# Patient Record
Sex: MALE | Race: WHITE | NOT HISPANIC OR LATINO | Employment: UNEMPLOYED | URBAN - METROPOLITAN AREA
[De-identification: names, ages, dates, MRNs, and addresses within clinical notes are randomized per-mention and may not be internally consistent; named-entity substitution may affect disease eponyms.]

---

## 2017-03-07 ENCOUNTER — ALLSCRIPTS OFFICE VISIT (OUTPATIENT)
Dept: OTHER | Facility: OTHER | Age: 37
End: 2017-03-07

## 2017-03-07 DIAGNOSIS — I87.2 VENOUS INSUFFICIENCY (CHRONIC) (PERIPHERAL): ICD-10-CM

## 2017-04-10 ENCOUNTER — APPOINTMENT (OUTPATIENT)
Dept: LAB | Facility: MEDICAL CENTER | Age: 37
End: 2017-04-10

## 2017-04-10 ENCOUNTER — OFFICE VISIT (OUTPATIENT)
Dept: URGENT CARE | Facility: MEDICAL CENTER | Age: 37
End: 2017-04-10

## 2017-04-10 ENCOUNTER — TRANSCRIBE ORDERS (OUTPATIENT)
Dept: URGENT CARE | Facility: MEDICAL CENTER | Age: 37
End: 2017-04-10

## 2017-04-10 DIAGNOSIS — Z00.00 PHYSICAL EXAM: ICD-10-CM

## 2017-04-10 DIAGNOSIS — Z00.00 PHYSICAL EXAM: Primary | ICD-10-CM

## 2017-04-10 LAB
ALBUMIN SERPL BCP-MCNC: 4.2 G/DL (ref 3.5–5)
ALP SERPL-CCNC: 67 U/L (ref 46–116)
ALT SERPL W P-5'-P-CCNC: 111 U/L (ref 12–78)
ANION GAP SERPL CALCULATED.3IONS-SCNC: 9 MMOL/L (ref 4–13)
AST SERPL W P-5'-P-CCNC: 61 U/L (ref 5–45)
ATRIAL RATE: 54 BPM
ATRIAL RATE: 54 BPM
BASOPHILS # BLD AUTO: 0.02 THOUSANDS/ΜL (ref 0–0.1)
BASOPHILS NFR BLD AUTO: 0 % (ref 0–1)
BILIRUB SERPL-MCNC: 1.56 MG/DL (ref 0.2–1)
BUN SERPL-MCNC: 14 MG/DL (ref 5–25)
CALCIUM SERPL-MCNC: 9.4 MG/DL (ref 8.3–10.1)
CHLORIDE SERPL-SCNC: 104 MMOL/L (ref 100–108)
CHOLEST SERPL-MCNC: 250 MG/DL (ref 50–200)
CO2 SERPL-SCNC: 25 MMOL/L (ref 21–32)
CREAT SERPL-MCNC: 1.05 MG/DL (ref 0.6–1.3)
EOSINOPHIL # BLD AUTO: 0.15 THOUSAND/ΜL (ref 0–0.61)
EOSINOPHIL NFR BLD AUTO: 3 % (ref 0–6)
ERYTHROCYTE [DISTWIDTH] IN BLOOD BY AUTOMATED COUNT: 11.9 % (ref 11.6–15.1)
GFR SERPL CREATININE-BSD FRML MDRD: >60 ML/MIN/1.73SQ M
GLUCOSE SERPL-MCNC: 100 MG/DL (ref 65–140)
HCT VFR BLD AUTO: 44.1 % (ref 36.5–49.3)
HDLC SERPL-MCNC: 36 MG/DL (ref 40–60)
HGB BLD-MCNC: 15.4 G/DL (ref 12–17)
LDLC SERPL CALC-MCNC: 164 MG/DL (ref 0–100)
LYMPHOCYTES # BLD AUTO: 1.15 THOUSANDS/ΜL (ref 0.6–4.47)
LYMPHOCYTES NFR BLD AUTO: 25 % (ref 14–44)
MCH RBC QN AUTO: 32.2 PG (ref 26.8–34.3)
MCHC RBC AUTO-ENTMCNC: 34.9 G/DL (ref 31.4–37.4)
MCV RBC AUTO: 92 FL (ref 82–98)
MONOCYTES # BLD AUTO: 0.38 THOUSAND/ΜL (ref 0.17–1.22)
MONOCYTES NFR BLD AUTO: 8 % (ref 4–12)
NEUTROPHILS # BLD AUTO: 2.84 THOUSANDS/ΜL (ref 1.85–7.62)
NEUTS SEG NFR BLD AUTO: 64 % (ref 43–75)
NRBC BLD AUTO-RTO: 0 /100 WBCS
P AXIS: -21 DEGREES
P AXIS: -5 DEGREES
PLATELET # BLD AUTO: 199 THOUSANDS/UL (ref 149–390)
PMV BLD AUTO: 12.1 FL (ref 8.9–12.7)
POTASSIUM SERPL-SCNC: 3.6 MMOL/L (ref 3.5–5.3)
PR INTERVAL: 142 MS
PR INTERVAL: 150 MS
PROT SERPL-MCNC: 8.1 G/DL (ref 6.4–8.2)
QRS AXIS: -9 DEGREES
QRS AXIS: -9 DEGREES
QRSD INTERVAL: 86 MS
QRSD INTERVAL: 92 MS
QT INTERVAL: 442 MS
QT INTERVAL: 444 MS
QTC INTERVAL: 419 MS
QTC INTERVAL: 421 MS
RBC # BLD AUTO: 4.79 MILLION/UL (ref 3.88–5.62)
SODIUM SERPL-SCNC: 138 MMOL/L (ref 136–145)
T WAVE AXIS: 11 DEGREES
T WAVE AXIS: 11 DEGREES
TRIGL SERPL-MCNC: 248 MG/DL
VENTRICULAR RATE: 54 BPM
VENTRICULAR RATE: 54 BPM
WBC # BLD AUTO: 4.54 THOUSAND/UL (ref 4.31–10.16)

## 2017-04-10 PROCEDURE — 93005 ELECTROCARDIOGRAM TRACING: CPT

## 2017-04-10 PROCEDURE — 85025 COMPLETE CBC W/AUTO DIFF WBC: CPT

## 2017-04-10 PROCEDURE — 80053 COMPREHEN METABOLIC PANEL: CPT

## 2017-04-10 PROCEDURE — 36415 COLL VENOUS BLD VENIPUNCTURE: CPT

## 2017-04-10 PROCEDURE — 80061 LIPID PANEL: CPT

## 2017-04-25 ENCOUNTER — GENERIC CONVERSION - ENCOUNTER (OUTPATIENT)
Dept: OTHER | Facility: OTHER | Age: 37
End: 2017-04-25

## 2017-05-17 DIAGNOSIS — I83.813 VARICOSE VEINS OF BOTH LOWER EXTREMITIES WITH PAIN: ICD-10-CM

## 2017-05-26 ENCOUNTER — APPOINTMENT (OUTPATIENT)
Dept: LAB | Facility: MEDICAL CENTER | Age: 37
End: 2017-05-26
Payer: COMMERCIAL

## 2017-05-26 DIAGNOSIS — I87.2 VENOUS INSUFFICIENCY (CHRONIC) (PERIPHERAL): ICD-10-CM

## 2017-05-26 LAB
ANION GAP SERPL CALCULATED.3IONS-SCNC: 8 MMOL/L (ref 4–13)
BUN SERPL-MCNC: 11 MG/DL (ref 5–25)
CALCIUM SERPL-MCNC: 9.4 MG/DL (ref 8.3–10.1)
CHLORIDE SERPL-SCNC: 106 MMOL/L (ref 100–108)
CO2 SERPL-SCNC: 27 MMOL/L (ref 21–32)
CREAT SERPL-MCNC: 1.08 MG/DL (ref 0.6–1.3)
ERYTHROCYTE [DISTWIDTH] IN BLOOD BY AUTOMATED COUNT: 12.1 % (ref 11.6–15.1)
GFR SERPL CREATININE-BSD FRML MDRD: >60 ML/MIN/1.73SQ M
GLUCOSE P FAST SERPL-MCNC: 84 MG/DL (ref 65–99)
HCT VFR BLD AUTO: 45.2 % (ref 36.5–49.3)
HGB BLD-MCNC: 15.7 G/DL (ref 12–17)
MCH RBC QN AUTO: 32.5 PG (ref 26.8–34.3)
MCHC RBC AUTO-ENTMCNC: 34.7 G/DL (ref 31.4–37.4)
MCV RBC AUTO: 94 FL (ref 82–98)
PLATELET # BLD AUTO: 182 THOUSANDS/UL (ref 149–390)
PMV BLD AUTO: 11.8 FL (ref 8.9–12.7)
POTASSIUM SERPL-SCNC: 3.9 MMOL/L (ref 3.5–5.3)
RBC # BLD AUTO: 4.83 MILLION/UL (ref 3.88–5.62)
SODIUM SERPL-SCNC: 141 MMOL/L (ref 136–145)
WBC # BLD AUTO: 3.67 THOUSAND/UL (ref 4.31–10.16)

## 2017-05-26 PROCEDURE — 36415 COLL VENOUS BLD VENIPUNCTURE: CPT

## 2017-05-26 PROCEDURE — 80048 BASIC METABOLIC PNL TOTAL CA: CPT

## 2017-05-26 PROCEDURE — 85027 COMPLETE CBC AUTOMATED: CPT

## 2017-06-06 RX ORDER — MULTIVITAMIN
1 TABLET ORAL DAILY
COMMUNITY
End: 2018-08-07 | Stop reason: SDUPTHER

## 2017-06-06 RX ORDER — LANOLIN ALCOHOL/MO/W.PET/CERES
1 CREAM (GRAM) TOPICAL 3 TIMES DAILY
COMMUNITY

## 2017-06-06 RX ORDER — OMEPRAZOLE 20 MG/1
20 CAPSULE, DELAYED RELEASE ORAL DAILY
COMMUNITY
End: 2018-07-25 | Stop reason: SDUPTHER

## 2017-06-13 ENCOUNTER — APPOINTMENT (OUTPATIENT)
Dept: RADIOLOGY | Facility: HOSPITAL | Age: 37
End: 2017-06-13
Payer: COMMERCIAL

## 2017-06-13 ENCOUNTER — ANESTHESIA EVENT (OUTPATIENT)
Dept: PERIOP | Facility: HOSPITAL | Age: 37
End: 2017-06-13
Payer: COMMERCIAL

## 2017-06-13 ENCOUNTER — ANESTHESIA (OUTPATIENT)
Dept: PERIOP | Facility: HOSPITAL | Age: 37
End: 2017-06-13
Payer: COMMERCIAL

## 2017-06-13 ENCOUNTER — HOSPITAL ENCOUNTER (OUTPATIENT)
Facility: HOSPITAL | Age: 37
Setting detail: OUTPATIENT SURGERY
Discharge: HOME | End: 2017-06-13
Attending: SURGERY | Admitting: SURGERY
Payer: COMMERCIAL

## 2017-06-13 VITALS
BODY MASS INDEX: 46.28 KG/M2 | HEIGHT: 66 IN | HEART RATE: 75 BPM | WEIGHT: 288 LBS | DIASTOLIC BLOOD PRESSURE: 78 MMHG | OXYGEN SATURATION: 95 % | RESPIRATION RATE: 16 BRPM | SYSTOLIC BLOOD PRESSURE: 150 MMHG | TEMPERATURE: 99.1 F

## 2017-06-13 DIAGNOSIS — I83.811 VARICOSE VEINS OF RIGHT LOWER EXTREMITY WITH PAIN: ICD-10-CM

## 2017-06-13 PROCEDURE — 93971 EXTREMITY STUDY: CPT

## 2017-06-13 RX ORDER — FENTANYL CITRATE 50 UG/ML
INJECTION, SOLUTION INTRAMUSCULAR; INTRAVENOUS AS NEEDED
Status: DISCONTINUED | OUTPATIENT
Start: 2017-06-13 | End: 2017-06-13 | Stop reason: SURG

## 2017-06-13 RX ORDER — PROPOFOL 10 MG/ML
INJECTION, EMULSION INTRAVENOUS AS NEEDED
Status: DISCONTINUED | OUTPATIENT
Start: 2017-06-13 | End: 2017-06-13 | Stop reason: SURG

## 2017-06-13 RX ORDER — ONDANSETRON 2 MG/ML
INJECTION INTRAMUSCULAR; INTRAVENOUS AS NEEDED
Status: DISCONTINUED | OUTPATIENT
Start: 2017-06-13 | End: 2017-06-13 | Stop reason: SURG

## 2017-06-13 RX ORDER — CHLORHEXIDINE GLUCONATE 0.12 MG/ML
15 RINSE ORAL ONCE
Status: DISCONTINUED | OUTPATIENT
Start: 2017-06-13 | End: 2017-06-13

## 2017-06-13 RX ORDER — SODIUM CHLORIDE, SODIUM LACTATE, POTASSIUM CHLORIDE, CALCIUM CHLORIDE 600; 310; 30; 20 MG/100ML; MG/100ML; MG/100ML; MG/100ML
20 INJECTION, SOLUTION INTRAVENOUS CONTINUOUS
Status: DISCONTINUED | OUTPATIENT
Start: 2017-06-13 | End: 2017-06-13 | Stop reason: HOSPADM

## 2017-06-13 RX ORDER — SUCCINYLCHOLINE/SOD CL,ISO/PF 100 MG/5ML
SYRINGE (ML) INTRAVENOUS AS NEEDED
Status: DISCONTINUED | OUTPATIENT
Start: 2017-06-13 | End: 2017-06-13 | Stop reason: SURG

## 2017-06-13 RX ORDER — SODIUM CHLORIDE, SODIUM LACTATE, POTASSIUM CHLORIDE, CALCIUM CHLORIDE 600; 310; 30; 20 MG/100ML; MG/100ML; MG/100ML; MG/100ML
100 INJECTION, SOLUTION INTRAVENOUS CONTINUOUS
Status: DISCONTINUED | OUTPATIENT
Start: 2017-06-13 | End: 2017-06-13 | Stop reason: HOSPADM

## 2017-06-13 RX ORDER — GLYCOPYRROLATE 0.2 MG/ML
INJECTION INTRAMUSCULAR; INTRAVENOUS AS NEEDED
Status: DISCONTINUED | OUTPATIENT
Start: 2017-06-13 | End: 2017-06-13 | Stop reason: SURG

## 2017-06-13 RX ORDER — FENTANYL CITRATE/PF 50 MCG/ML
25 SYRINGE (ML) INJECTION
Status: DISCONTINUED | OUTPATIENT
Start: 2017-06-13 | End: 2017-06-13 | Stop reason: HOSPADM

## 2017-06-13 RX ORDER — KETOROLAC TROMETHAMINE 30 MG/ML
INJECTION, SOLUTION INTRAMUSCULAR; INTRAVENOUS AS NEEDED
Status: DISCONTINUED | OUTPATIENT
Start: 2017-06-13 | End: 2017-06-13 | Stop reason: SURG

## 2017-06-13 RX ORDER — ONDANSETRON 2 MG/ML
4 INJECTION INTRAMUSCULAR; INTRAVENOUS ONCE AS NEEDED
Status: DISCONTINUED | OUTPATIENT
Start: 2017-06-13 | End: 2017-06-13 | Stop reason: HOSPADM

## 2017-06-13 RX ORDER — MIDAZOLAM HYDROCHLORIDE 1 MG/ML
INJECTION INTRAMUSCULAR; INTRAVENOUS AS NEEDED
Status: DISCONTINUED | OUTPATIENT
Start: 2017-06-13 | End: 2017-06-13 | Stop reason: SURG

## 2017-06-13 RX ADMIN — DEXAMETHASONE SODIUM PHOSPHATE 5 MG: 10 INJECTION INTRAMUSCULAR; INTRAVENOUS at 10:39

## 2017-06-13 RX ADMIN — FENTANYL CITRATE 50 MCG: 50 INJECTION, SOLUTION INTRAMUSCULAR; INTRAVENOUS at 11:49

## 2017-06-13 RX ADMIN — FENTANYL CITRATE 50 MCG: 50 INJECTION, SOLUTION INTRAMUSCULAR; INTRAVENOUS at 10:41

## 2017-06-13 RX ADMIN — GLYCOPYRROLATE 0.2 MG: 0.2 INJECTION INTRAMUSCULAR; INTRAVENOUS at 11:13

## 2017-06-13 RX ADMIN — SODIUM CHLORIDE, SODIUM LACTATE, POTASSIUM CHLORIDE, AND CALCIUM CHLORIDE: .6; .31; .03; .02 INJECTION, SOLUTION INTRAVENOUS at 11:30

## 2017-06-13 RX ADMIN — ONDANSETRON 4 MG: 2 INJECTION INTRAMUSCULAR; INTRAVENOUS at 10:39

## 2017-06-13 RX ADMIN — KETOROLAC TROMETHAMINE 30 MG: 30 INJECTION, SOLUTION INTRAMUSCULAR at 12:02

## 2017-06-13 RX ADMIN — SODIUM CHLORIDE, SODIUM LACTATE, POTASSIUM CHLORIDE, AND CALCIUM CHLORIDE 20 ML/HR: .6; .31; .03; .02 INJECTION, SOLUTION INTRAVENOUS at 09:38

## 2017-06-13 RX ADMIN — MIDAZOLAM HYDROCHLORIDE 2 MG: 1 INJECTION, SOLUTION INTRAMUSCULAR; INTRAVENOUS at 10:10

## 2017-06-13 RX ADMIN — FENTANYL CITRATE 100 MCG: 50 INJECTION, SOLUTION INTRAMUSCULAR; INTRAVENOUS at 10:18

## 2017-06-13 RX ADMIN — FENTANYL CITRATE 50 MCG: 50 INJECTION, SOLUTION INTRAMUSCULAR; INTRAVENOUS at 11:42

## 2017-06-13 RX ADMIN — CEFAZOLIN SODIUM 3000 MG: 1 SOLUTION INTRAVENOUS at 10:30

## 2017-06-13 RX ADMIN — PROPOFOL 300 MG: 10 INJECTION, EMULSION INTRAVENOUS at 10:18

## 2017-06-13 RX ADMIN — Medication 180 MG: at 10:18

## 2017-06-15 ENCOUNTER — ALLSCRIPTS OFFICE VISIT (OUTPATIENT)
Dept: OTHER | Facility: OTHER | Age: 37
End: 2017-06-15

## 2017-06-17 ENCOUNTER — HOSPITAL ENCOUNTER (EMERGENCY)
Facility: HOSPITAL | Age: 37
Discharge: HOME | End: 2017-06-17
Attending: EMERGENCY MEDICINE | Admitting: EMERGENCY MEDICINE
Payer: COMMERCIAL

## 2017-06-17 ENCOUNTER — APPOINTMENT (EMERGENCY)
Dept: ULTRASOUND IMAGING | Facility: HOSPITAL | Age: 37
End: 2017-06-17
Payer: COMMERCIAL

## 2017-06-17 VITALS
DIASTOLIC BLOOD PRESSURE: 102 MMHG | TEMPERATURE: 98.1 F | SYSTOLIC BLOOD PRESSURE: 157 MMHG | HEART RATE: 70 BPM | OXYGEN SATURATION: 98 % | BODY MASS INDEX: 47.07 KG/M2 | WEIGHT: 291.6 LBS | RESPIRATION RATE: 18 BRPM

## 2017-06-17 DIAGNOSIS — I82.441 ACUTE DEEP VEIN THROMBOSIS (DVT) OF TIBIAL VEIN OF RIGHT LOWER EXTREMITY (HCC): Primary | ICD-10-CM

## 2017-06-17 LAB
ANION GAP SERPL CALCULATED.3IONS-SCNC: 9 MMOL/L (ref 4–13)
BUN SERPL-MCNC: 9 MG/DL (ref 5–25)
CALCIUM SERPL-MCNC: 9.5 MG/DL (ref 8.3–10.1)
CHLORIDE SERPL-SCNC: 106 MMOL/L (ref 100–108)
CO2 SERPL-SCNC: 28 MMOL/L (ref 21–32)
CREAT SERPL-MCNC: 1.07 MG/DL (ref 0.6–1.3)
GFR SERPL CREATININE-BSD FRML MDRD: >60 ML/MIN/1.73SQ M
GLUCOSE SERPL-MCNC: 91 MG/DL (ref 65–140)
POTASSIUM SERPL-SCNC: 4.3 MMOL/L (ref 3.5–5.3)
SODIUM SERPL-SCNC: 143 MMOL/L (ref 136–145)

## 2017-06-17 PROCEDURE — 80048 BASIC METABOLIC PNL TOTAL CA: CPT | Performed by: EMERGENCY MEDICINE

## 2017-06-17 PROCEDURE — 36415 COLL VENOUS BLD VENIPUNCTURE: CPT | Performed by: EMERGENCY MEDICINE

## 2017-06-17 PROCEDURE — 99284 EMERGENCY DEPT VISIT MOD MDM: CPT

## 2017-06-17 PROCEDURE — 93970 EXTREMITY STUDY: CPT

## 2017-06-19 ENCOUNTER — GENERIC CONVERSION - ENCOUNTER (OUTPATIENT)
Dept: OTHER | Facility: OTHER | Age: 37
End: 2017-06-19

## 2017-06-19 DIAGNOSIS — M79.605 PAIN OF LEFT LEG: ICD-10-CM

## 2017-06-19 DIAGNOSIS — I83.813 VARICOSE VEINS OF BOTH LOWER EXTREMITIES WITH PAIN: ICD-10-CM

## 2017-06-19 DIAGNOSIS — I82.441 EMBOLISM AND THROMBOSIS OF RIGHT TIBIAL VEIN (HCC): ICD-10-CM

## 2017-07-07 ENCOUNTER — HOSPITAL ENCOUNTER (OUTPATIENT)
Dept: NON INVASIVE DIAGNOSTICS | Facility: CLINIC | Age: 37
Discharge: HOME | End: 2017-07-07
Payer: COMMERCIAL

## 2017-07-07 ENCOUNTER — ALLSCRIPTS OFFICE VISIT (OUTPATIENT)
Dept: OTHER | Facility: OTHER | Age: 37
End: 2017-07-07

## 2017-07-07 DIAGNOSIS — I83.813 VARICOSE VEINS OF BOTH LOWER EXTREMITIES WITH PAIN: ICD-10-CM

## 2017-07-07 PROCEDURE — 93971 EXTREMITY STUDY: CPT

## 2017-07-10 ENCOUNTER — HOSPITAL ENCOUNTER (OUTPATIENT)
Dept: NON INVASIVE DIAGNOSTICS | Facility: CLINIC | Age: 37
Discharge: HOME | End: 2017-07-10
Payer: COMMERCIAL

## 2017-07-10 DIAGNOSIS — I82.441 EMBOLISM AND THROMBOSIS OF RIGHT TIBIAL VEIN (HCC): ICD-10-CM

## 2017-07-10 PROCEDURE — 93971 EXTREMITY STUDY: CPT

## 2017-07-18 ENCOUNTER — TRANSCRIBE ORDERS (OUTPATIENT)
Dept: LAB | Facility: CLINIC | Age: 37
End: 2017-07-18

## 2017-07-18 ENCOUNTER — APPOINTMENT (OUTPATIENT)
Dept: LAB | Facility: CLINIC | Age: 37
End: 2017-07-18
Payer: COMMERCIAL

## 2017-07-18 DIAGNOSIS — Z00.8 HEALTH EXAMINATION IN POPULATION SURVEY: ICD-10-CM

## 2017-07-18 DIAGNOSIS — Z00.8 HEALTH EXAMINATION IN POPULATION SURVEY: Primary | ICD-10-CM

## 2017-07-18 DIAGNOSIS — R79.89 ELEVATED LFTS: ICD-10-CM

## 2017-07-18 DIAGNOSIS — R79.89 ELEVATED LFTS: Primary | ICD-10-CM

## 2017-07-18 LAB
ALBUMIN SERPL BCP-MCNC: 4 G/DL (ref 3.5–5)
ALP SERPL-CCNC: 68 U/L (ref 46–116)
ALT SERPL W P-5'-P-CCNC: 98 U/L (ref 12–78)
ANION GAP SERPL CALCULATED.3IONS-SCNC: 10 MMOL/L (ref 4–13)
AST SERPL W P-5'-P-CCNC: 55 U/L (ref 5–45)
BILIRUB SERPL-MCNC: 1.4 MG/DL (ref 0.2–1)
BUN SERPL-MCNC: 11 MG/DL (ref 5–25)
CALCIUM SERPL-MCNC: 9.4 MG/DL (ref 8.3–10.1)
CHLORIDE SERPL-SCNC: 103 MMOL/L (ref 100–108)
CHOLEST SERPL-MCNC: 237 MG/DL (ref 50–200)
CO2 SERPL-SCNC: 27 MMOL/L (ref 21–32)
CREAT SERPL-MCNC: 1.23 MG/DL (ref 0.6–1.3)
EST. AVERAGE GLUCOSE BLD GHB EST-MCNC: 103 MG/DL
GFR SERPL CREATININE-BSD FRML MDRD: >60 ML/MIN/1.73SQ M
GGT SERPL-CCNC: 42 U/L (ref 5–85)
GLUCOSE P FAST SERPL-MCNC: 103 MG/DL (ref 65–99)
HBA1C MFR BLD: 5.2 % (ref 4.2–6.3)
HDLC SERPL-MCNC: 39 MG/DL (ref 40–60)
LDLC SERPL CALC-MCNC: 171 MG/DL (ref 0–100)
POTASSIUM SERPL-SCNC: 4.3 MMOL/L (ref 3.5–5.3)
PROT SERPL-MCNC: 7.6 G/DL (ref 6.4–8.2)
SODIUM SERPL-SCNC: 140 MMOL/L (ref 136–145)
TRIGL SERPL-MCNC: 134 MG/DL

## 2017-07-18 PROCEDURE — 80053 COMPREHEN METABOLIC PANEL: CPT

## 2017-07-18 PROCEDURE — 82977 ASSAY OF GGT: CPT

## 2017-07-18 PROCEDURE — 80061 LIPID PANEL: CPT

## 2017-07-18 PROCEDURE — 83036 HEMOGLOBIN GLYCOSYLATED A1C: CPT

## 2017-07-18 PROCEDURE — 36415 COLL VENOUS BLD VENIPUNCTURE: CPT

## 2017-08-11 ENCOUNTER — ALLSCRIPTS OFFICE VISIT (OUTPATIENT)
Dept: OTHER | Facility: OTHER | Age: 37
End: 2017-08-11

## 2017-10-11 ENCOUNTER — HOSPITAL ENCOUNTER (OUTPATIENT)
Dept: NON INVASIVE DIAGNOSTICS | Facility: CLINIC | Age: 37
Discharge: HOME | End: 2017-10-11
Payer: COMMERCIAL

## 2017-10-11 DIAGNOSIS — M79.605 PAIN OF LEFT LEG: ICD-10-CM

## 2017-10-11 PROCEDURE — 93971 EXTREMITY STUDY: CPT

## 2017-10-31 ENCOUNTER — GENERIC CONVERSION - ENCOUNTER (OUTPATIENT)
Dept: OTHER | Facility: OTHER | Age: 37
End: 2017-10-31

## 2018-01-10 NOTE — PROGRESS NOTES
Preliminary Nursing Report           Patient Information   Initial Encounter Entry Date:   2017 1:40 PM EST (Automated Transmission Automated Transmission)     Last Modified:   {Olive Hunter}          Legal Name: Akira Colby      Social Security Number:      YOB: 1980      Age (years): 40      Gender: M      Body Mass Index (BMI): 48 kg/m2      Height: 66 in  Weight: 300 lbs (136 kgs)        Address:   [address] [place] [address] [address] [place] [id number]            Phone: [phone]   (consent to leave messages)      Email:      Ethnicity: Decline to State      Mandaeism:      Marital Status:      Preferred Language: English      Race: Other Race              Patient Insurance Information      Primary Insurance InformationCarrier Name: {Primary  CarrierName}        Carrier Address:   {Primary  CarrierAddress}           Carrier Phone: {Primary  CarrierPhone}        Group Number: {Primary  GroupNumber}        Policy Number: {Primary  PolicyNumber}        Insured Name: {Primary  InsuredName}        Insured : {Primary  InsuredDOB}        Relationship to Insured: {Primary  RelationshiptoInsured}          Secondary Insurance InformationCarrier Name: {Secondary  CarrierName}        Carrier Address:   {Secondary  CarrierAddress}           Carrier Phone: {Secondary  CarrierPhone}        Group Number: {Secondary  GroupNumber}        Policy Number: {Secondary  PolicyNumber}        Insured Name: {Secondary  InsuredName}        Insured : {Secondary  InsuredDOB}        Relationship to Insured: {Secondary  RelationshiptoInsured}                Health Profile   Booking #:   [facility] #: [id number]           DOS: 2017    Surgery : Endovenous ablation therapy of incompetent vein, extremity, inclusive of all imaging guidance and monitoring, percutaneous, laser; first  vein treated    Add'l Procedures/Notes:     Surgery Risk: Minor       Precautions   BMI               Allergies   No Known Drug Allergies Medications   Omeprazole 20 MG Oral Capsule Delayed Release     Oxycodone-Acetaminophen 5-325 MG Oral Tablet            Conditions   GERD (gastroesophageal reflux disease)     Varicose veins of bilateral lower extremities with pain     Venous insufficiency of both lower extremities            Family History   None          Surgical History   None          Social History   Never a smoker     Social alcohol use                       Patient Instructions     Medical Procedure Risk  NPO Instructions   The day before surgery it is recommended to have a light dinner at your usual time and you are allowed a light snack  early in the evening  Do not eat anything heavy or eat a big meal after 7pm  Do not eat or drink anything after midnight prior to your surgery  If you are supposed to take any of your medications, do so with a sip of water  Failure to follow these instructions  can lead to an increased risk of lung complications and may result in a delay or cancellation of your procedure  If you have any questions, contact your institution for further instructions  No candy, no gum, no mints, no chewing tobacco        Oxycodone-Acetaminophen 5-325 MG Oral Tablet  Medication Instruction (Opioids - Pain Medication)  62  Please continue the following medications, if needed, up to and including the day of surgery (with a sip of water)  GERD (gastroesophageal reflux disease)  Medication Instruction (Reflux Disease)   Please continue to take this medication on your normal schedule  If this is  an oral medication and you take in the morning, you may do so with a sip of water  Testing Considerations     No recommendations for this classification  Consultations     No recommendations for this classification  Miscellaneous Questions      Question: Are you able to walk up a flight of stairs, walk up a hill or do heavy housework WITHOUT having chest pain or shortness of breath?      Answer: Nani Bah Allergies/Conditions/Medications Not Found      The following were not recognized by our system when generating the recommendations  Please consider if this would impact any preoperative protocols  Never a smoker      Social alcohol use             Appointment Information      Date:    06/13/2017      Location:    [place]      Address:         Directions:                 Footnotes revision 14     Denotes a free-text entry  Legal Disclaimer: Any and all recommendations and services provided herein are designed to assist in the preoperative care of the patient  Nothing contained herein is designed to replace,  eliminate or alleviate the responsibility of the attending physician to supervise and determine the patients preoperative care and course of treatment  Failure to provide complete, accurate information may negatively impact the system s ability to recommend the proper preoperative protocol  THE ATTENDING PHYSICIAN IS RESPONSIBLE TO REVIEW THE SUGGESTED PREOPERATIVE PROTOCOLS/COURSE OF TREATMENT AND PRESCRIBE THE FINAL COURSE OF PREOPERATIVE TREATMENT IN CONSULTATION  WITH THE PATIENT  THE ePREOP SYSTEM AND ITS MATERIALS ARE PROVIDED AS IS WITHOUT WARRANTY OF ANY KIND, EXPRESS OR IMPLIED, INCLUDING, BUT NOT LIMITED TO, WARRANTIES OF PERFORMANCE OR MERCHANTABILITY OR FITNESS FOR  A PARTICULAR PURPOSE  PATIENT AND PHYSICIANS HEREBY AGREE THAT THEIR USE OF THE MATERIALS AND RESOURCES ACT AS A CONSENT TO RELEASE AND WAIVE ePREOP FROM ANY AND ALL CLAIMS OF WARRANTY, TORT OR CONTRACT LAW OF ANY KIND             Electronically signed by:Josephine Olguin MD  May  8 2017  7:29PM EST

## 2018-01-12 NOTE — MISCELLANEOUS
Message  Dr Paul Tompkins left a message from weekend that patient had tibial DVT and was started on Xarelto 15 mgm BID for 21 days, and he should have repeat ultrasound and ov in 3 weeks  message to scheduling and I left message for wife on cell to call   Active Problems    1  GERD (gastroesophageal reflux disease) (530 81) (K21 9)   2  Postoperative state (V45 89) (Z98 890)   3  Varicose veins of bilateral lower extremities with pain (454 8) (I83 813)   4  Venous insufficiency of both lower extremities (459 81) (I87 2)    Current Meds   1  Omeprazole 20 MG Oral Capsule Delayed Release; Therapy: (Recorded:59Wwh9858) to Recorded   2  Oxycodone-Acetaminophen 5-325 MG Oral Tablet; TAKE 1 TABLET EVERY 4 TO 6   HOURS AS NEEDED FOR PAIN;   Therapy: [id number] to (Evaluate:12Mar2017); Last Rx:07Mar2017 Ordered    Allergies    1  No Known Drug Allergies    Signatures   Electronically signed by :  Tam Carroll, ; Jun 19 2017 10:00AM EST                       (Author)

## 2018-01-13 VITALS
WEIGHT: 297.5 LBS | DIASTOLIC BLOOD PRESSURE: 80 MMHG | HEART RATE: 76 BPM | RESPIRATION RATE: 18 BRPM | SYSTOLIC BLOOD PRESSURE: 126 MMHG | HEIGHT: 66 IN | BODY MASS INDEX: 47.81 KG/M2

## 2018-01-14 VITALS
SYSTOLIC BLOOD PRESSURE: 132 MMHG | RESPIRATION RATE: 16 BRPM | WEIGHT: 300.25 LBS | DIASTOLIC BLOOD PRESSURE: 90 MMHG | BODY MASS INDEX: 48.25 KG/M2 | HEIGHT: 66 IN | HEART RATE: 68 BPM

## 2018-01-14 VITALS
DIASTOLIC BLOOD PRESSURE: 82 MMHG | BODY MASS INDEX: 46.79 KG/M2 | HEIGHT: 66 IN | WEIGHT: 291.13 LBS | SYSTOLIC BLOOD PRESSURE: 134 MMHG | HEART RATE: 72 BPM | RESPIRATION RATE: 16 BRPM | TEMPERATURE: 97.2 F | OXYGEN SATURATION: 98 %

## 2018-01-14 VITALS
HEIGHT: 66 IN | HEART RATE: 60 BPM | RESPIRATION RATE: 16 BRPM | SYSTOLIC BLOOD PRESSURE: 122 MMHG | DIASTOLIC BLOOD PRESSURE: 84 MMHG

## 2018-01-16 NOTE — MISCELLANEOUS
Message  spoke to wife , cancelled doppler for today and will schedule doppler and ov 7/7 to recheck DVT and see about length of Xarelto      Active Problems    1  GERD (gastroesophageal reflux disease) (530 81) (K21 9)   2  Postoperative state (V45 89) (Z98 890)   3  Varicose veins of bilateral lower extremities with pain (454 8) (I83 813)   4  Venous insufficiency of both lower extremities (459 81) (I87 2)    Current Meds   1  Omeprazole 20 MG Oral Capsule Delayed Release; Therapy: (Recorded:83Eqt2865) to Recorded   2  Oxycodone-Acetaminophen 5-325 MG Oral Tablet; TAKE 1 TABLET EVERY 4 TO 6   HOURS AS NEEDED FOR PAIN;   Therapy: 73CRN4770 to (Evaluate:12Mar2017); Last Rx:07Mar2017 Ordered    Allergies    1  No Known Drug Allergies    Plan  Varicose veins of bilateral lower extremities with pain    · VAS LOWER LIMB VENOUS DUPLEX STUDY, UNILATERAL/LIMITED; Unilateral  Side:Right; Status:Hold For - Scheduling,Retrospective By Protocol Authorization; Requested GIM:[id number];     Signatures   Electronically signed by :  Reji Perez, ; Jun 19 2017 10:08AM EST                       (Author)

## 2018-01-16 NOTE — RESULT NOTES
Message    Esophageal biopsies are benign  Patient to continue omeprazole  LMOM for pt to call the office for results  CF         Verified Results  (1) TISSUE EXAM [id number] 10:36AM Zakia Nogueira     Test Name Result Flag Reference   LAB AP CASE REPORT (Report)     Surgical Pathology Report             Case: [id number]                   Authorizing Provider: Andrea Smith MD     Collected:      07/19/2016 1036        Ordering Location:   [facility]    Received:      07/19/2016 [address] [facility]                               Pathologist:      Saul Mukherjee MD                             Specimen:  Esophagus, cold bx distal esophagus   LAB AP FINAL DIAGNOSIS      A  Esophagus, distal, biopsy:  - Gastroesophageal junctional mucosa with mild reactive changes, negative   for intestinal metaplasia (Dewitt's esophagus), dysplasia or carcinoma  Electronically signed by Saul Mukherjee MD on 7/22/2016 at 9:31 AM   LAB AP SURGICAL ADDITIONAL INFORMATION (Report)     These tests were developed and their performance characteristics   determined by [facility] or [address] [place] [address]  They may not be cleared or approved by the [place]  Food and   Drug Administration  The FDA has determined that such clearance or   approval is not necessary  These tests are used for clinical purposes  They should not be regarded as investigational or for research  This   laboratory has been approved by [place] [address], designated as a high-complexity   laboratory and is qualified to perform these tests  Interpretation performed at [facility]   LAB AP GROSS DESCRIPTION (Report)     A  The specimen is received in formalin, labeled with the patient's name   and hospital number, and is designated biopsy distal esophagus   The   specimen consists of a single, rubbery, tan-brown tissue fragment   measuring up to 0 3 cm in greatest dimension  Entirely submitted  One   cassette  Note: The estimated total formalin fixation time based upon information   provided by the submitting clinician and the standard processing schedule   is 18 0 hours      SRS   LAB AP CLINICAL INFORMATION R/o luciano's     R/o luciano's

## 2018-01-17 NOTE — PROGRESS NOTES
Assessment    1  Varicose veins of bilateral lower extremities with pain (454 8) (E02 991)    Plan    1  Keep your leg elevated whenever you can to decrease swelling and increase circulation ;   Status:Complete;   Done: 95LDH2491   2  Support stockings can help keep the blood from pooling in the small veins in your feet   and legs ; Status:Complete;   Done: 09SRE4506   3  Gradient compression stocking, below knee, 20-30mm Hg, each; Status:Complete;     Done: 67GXT8751   4  VAS LOWER LIMB VENOUS DUPLEX STUDY, WITH REFLUX ASSESSMENT, COMPLETE   BILATERAL; Status:Active; Requested for:84Zby3882;    5  Follow-up visit in 3 months Evaluation and Treatment  Follow-up with physician  Status:   Complete  Done: 49ZEG4429    Discussion/Summary  Discussion Summary: This patient has bilateral lower extremity varicosities, worse to the right leg  He is experiencing intermittent throbbing pain overlying varicosity to right calf  We discussed the pathophysiology of venous disease  He will begin a course of conservative management to include the daily use of 20-30 mmHg gradient compression stockings, elevation, regular physical activity and weight loss/maintenance of a healthy weight  He will have a venous study to assess for reflux and return to the office with a physician for review  Chief Complaint  Chief Complaint Free Text Note Form: "I am here to discuss my varicose veins "         History of Present Illness  Varicose Veins LK FREEMAN Vascular: The patient is being seen for a consultation regarding varicose veins  Symptoms:  right bulging veins, bilateral spider veins and right leg pain, but no leg swelling, no muscle cramps, no stasis dermatitis, no cellulitis, no hyperpigmentation, no venous ulcers, no dry skin, no itching and no bleeding  The patient describes the pain as throbbing  This patient has no history of DVT, pulmonary embolism, superficial venous thrombosis, or a hypercoagulable state     Evaluation and Treatment History: This patient has had no prior surgical treatment of the venous system  This patient is not currently utilizing any conservative management strategies to manage the current symptoms  Free Text HPI: Patient is new to our practice, referred by Dr Elizabeth Mosley  He complains of bulging veins in his legs  He states that he gets "random throbbing" pain to the right calf overlying area of varicosity  This throbbing pain is not consistent and can occur at any time, during the morning or throughout the day  He does not wear compression stockings  Approximately 2 years ago he tried to wear compression stockings, but they caused his feet to go numb so he stopped use  He does elevate his legs  Review of Systems  Complete Male - Vasc:   Constitutional: No fever or chills, feels well, no tiredness, no recent weight gain or weight loss  Eyes: No sudden vision loss, no blurred vision, no double vision  ENT: no loss of hearing, no nosebleeds, no hoarseness  Cardiovascular: painful veins and leg pain with walking  Respiratory: No sob, no wheezing, no cough, no sob with exertion, no orthopnea  Gastrointestinal: No nausea, No vomiting, no diarrhea, no blood in stool  Genitourinary: no dysuria, no hematuria, No urinary incontinence, no erectile dysfunction  Musculoskeletal: limb pain  Integumentary: no rash, no lesions, no wounds, no ulcer  Neurological: no dementia, no headache, no numbness, no limb weakness, no dizziness, no difficulty walking  Psychiatric: no depression, no mood disorders, no anxiety  Hematologic/Lymphatic: no bleeding disorder, no easy bruising  ROS Reviewed:   ROS reviewed  Active Problems    1  GERD (gastroesophageal reflux disease) (912 27) (K21 9)    Past Medical History    1  GERD (gastroesophageal reflux disease) (048 59) (K21 9)  Active Problems And Past Medical History Reviewed:    The active problems and past medical history were reviewed and updated today       Surgical History  Surgical History Reviewed: The surgical history was reviewed and updated today  Family History  Mother    1  Denied: Family history of Colon cancer   2  Denied: Family history of Crohn's disease without complication, unspecified   gastrointestinal tract location   3  Denied: Family history of liver disease  Father    4  Denied: Family history of Colon cancer   5  Denied: Family history of Crohn's disease without complication, unspecified   gastrointestinal tract location   6  Denied: Family history of liver disease  Family History Reviewed: The family history was reviewed and updated today  Social History    · Never a smoker   · Social alcohol use (Z78 9)  Social History Reviewed: The social history was reviewed and updated today  Current Meds   1  Omeprazole 20 MG Oral Capsule Delayed Release; Therapy: (Recorded:44Qfj8973) to Recorded   2  Zantac 150 MG CAPS; Therapy: (Recorded:54Cxu5807) to Recorded  Medication List Reviewed: The medication list was reviewed and updated today  Allergies    1  No Known Drug Allergies    Vitals  Vital Signs    Recorded: 91ELM2839 37:73CA   Systolic 441, LUE, Sitting   Diastolic 88, LUE, Sitting   Heart Rate 79, L Radial   Pulse Quality Normal, L Radial   Respiration Quality Normal   Respiration 18   Height 5 ft 6 in   Weight 301 lb    BMI Calculated 48 58   BSA Calculated 2 38     Physical Exam    Posterior tibialis: right 2+ and left 2+  Dorsalis pedis: right 2+ and left 2+  Distal Pulse Exam: Normal Capillary Refill  Extremities: No upper or lower extremity edema  LE Varicose Veins: right leg truncal veins, right leg reticular veins, right leg spider veins and left leg spider veins  Pulmonary   Respiratory effort: No increased work of breathing or signs of respiratory distress  Abdomen   Abdomen: The abdomen was obese     Psychiatric   Orientation to person, place and time: Normal    Eyes   Conjunctiva and lids: No swelling, erythema, or discharge  Ears, Nose, Mouth, and Throat   Hearing: Normal    Neurologic Sensory exam normal   Motor skills intact  Musculoskeletal   Gait and station: Normal    Muscle strength/tone: Normal    Skin   Skin and subcutaneous tissue: Normal without rashes or lesions     Venous Disease: No lipodermatosclerosis, stasis dermatitis, hyperpigmentation, or atrophie boaz noted on exam       Future Appointments    Date/Time Provider Specialty Site   12/12/2016 03:45 PM Doctor, Jeri Esquivel MD Vascular Surgery [facility]     Signatures   Electronically signed by : Eran Perez; Sep  8 2016  5:15PM EST                       (Author)    Electronically signed by : Angelo Bruno DO; Sep 12 2016  4:48AM EST                       (Author)

## 2018-01-22 VITALS
SYSTOLIC BLOOD PRESSURE: 134 MMHG | BODY MASS INDEX: 48.7 KG/M2 | TEMPERATURE: 98.3 F | WEIGHT: 303 LBS | HEIGHT: 66 IN | DIASTOLIC BLOOD PRESSURE: 90 MMHG | RESPIRATION RATE: 16 BRPM | HEART RATE: 68 BPM

## 2018-07-25 ENCOUNTER — TELEPHONE (OUTPATIENT)
Dept: GASTROENTEROLOGY | Facility: MEDICAL CENTER | Age: 38
End: 2018-07-25

## 2018-07-25 DIAGNOSIS — K21.9 GASTROESOPHAGEAL REFLUX DISEASE, ESOPHAGITIS PRESENCE NOT SPECIFIED: Primary | ICD-10-CM

## 2018-07-25 RX ORDER — OMEPRAZOLE 20 MG/1
20 CAPSULE, DELAYED RELEASE ORAL DAILY
Qty: 30 CAPSULE | Refills: 1 | Status: SHIPPED | OUTPATIENT
Start: 2018-07-25 | End: 2018-09-18 | Stop reason: SDUPTHER

## 2018-07-25 NOTE — TELEPHONE ENCOUNTER
Please advise, I sent refill for omeprazole 20 mg to his pharmacy on file, enough for 2 months, which should cover him until his upcoming appointment with Dr Leanne Rendon    Thank you

## 2018-07-25 NOTE — TELEPHONE ENCOUNTER
Dr [place] pt called stating he will need a refill on his omeprazole 20mg  Pt stated he filled his last refill on 07/06/18   Pt can be reached at [phone]

## 2018-08-07 ENCOUNTER — OFFICE VISIT (OUTPATIENT)
Dept: OBGYN CLINIC | Facility: OTHER | Age: 38
End: 2018-08-07
Payer: COMMERCIAL

## 2018-08-07 ENCOUNTER — APPOINTMENT (OUTPATIENT)
Dept: RADIOLOGY | Facility: OTHER | Age: 38
End: 2018-08-07
Payer: COMMERCIAL

## 2018-08-07 VITALS
DIASTOLIC BLOOD PRESSURE: 94 MMHG | BODY MASS INDEX: 46.3 KG/M2 | HEART RATE: 81 BPM | WEIGHT: 295 LBS | SYSTOLIC BLOOD PRESSURE: 166 MMHG | HEIGHT: 67 IN

## 2018-08-07 DIAGNOSIS — M25.512 ACUTE PAIN OF LEFT SHOULDER: ICD-10-CM

## 2018-08-07 DIAGNOSIS — M25.512 ACUTE PAIN OF LEFT SHOULDER: Primary | ICD-10-CM

## 2018-08-07 PROCEDURE — 99203 OFFICE O/P NEW LOW 30 MIN: CPT | Performed by: ORTHOPAEDIC SURGERY

## 2018-08-07 PROCEDURE — 73030 X-RAY EXAM OF SHOULDER: CPT

## 2018-08-07 PROCEDURE — 20610 DRAIN/INJ JOINT/BURSA W/O US: CPT | Performed by: ORTHOPAEDIC SURGERY

## 2018-08-07 RX ORDER — BUPIVACAINE HYDROCHLORIDE 2.5 MG/ML
2 INJECTION, SOLUTION INFILTRATION; PERINEURAL
Status: COMPLETED | OUTPATIENT
Start: 2018-08-07 | End: 2018-08-07

## 2018-08-07 RX ORDER — BETAMETHASONE SODIUM PHOSPHATE AND BETAMETHASONE ACETATE 3; 3 MG/ML; MG/ML
6 INJECTION, SUSPENSION INTRA-ARTICULAR; INTRALESIONAL; INTRAMUSCULAR; SOFT TISSUE
Status: COMPLETED | OUTPATIENT
Start: 2018-08-07 | End: 2018-08-07

## 2018-08-07 RX ORDER — OMEPRAZOLE/SODIUM BICARBONATE 20MG-1.1G
CAPSULE ORAL
COMMUNITY
End: 2018-08-07 | Stop reason: SDUPTHER

## 2018-08-07 RX ORDER — ASPIRIN 81 MG/1
TABLET ORAL DAILY
COMMUNITY
Start: 2017-10-31 | End: 2018-10-08 | Stop reason: CLARIF

## 2018-08-07 RX ORDER — AMOXICILLIN 500 MG/1
CAPSULE ORAL
COMMUNITY
End: 2018-08-07 | Stop reason: ALTCHOICE

## 2018-08-07 RX ORDER — ALBUTEROL SULFATE 90 UG/1
AEROSOL, METERED RESPIRATORY (INHALATION)
COMMUNITY
End: 2018-10-08 | Stop reason: ALTCHOICE

## 2018-08-07 RX ADMIN — BUPIVACAINE HYDROCHLORIDE 2 ML: 2.5 INJECTION, SOLUTION INFILTRATION; PERINEURAL at 14:14

## 2018-08-07 RX ADMIN — BETAMETHASONE SODIUM PHOSPHATE AND BETAMETHASONE ACETATE 6 MG: 3; 3 INJECTION, SUSPENSION INTRA-ARTICULAR; INTRALESIONAL; INTRAMUSCULAR; SOFT TISSUE at 14:14

## 2018-08-07 NOTE — PROGRESS NOTES
Assessment  Diagnoses and all orders for this visit:    Acute pain of left shoulder        Discussion and Plan:    The patient appears to have sustained an acute strain to his left shoulder and possibly may have injured his labrum or his rotator cuff, his examination today does not point meet were surgical pathology and therefore provided him with an injection and referral to physical therapy, if the glenohumeral injection and the therapy to improve his symptoms then an MRI arthrogram will be important to better determine if structural pathology exists  We will check his progress in 6 weeks or sooner if necessary    Subjective:   Patient ID: Lisa Villegas is a 41 y o  male      Patient presents with a new injury to his left shoulder  I performed an arthroscopic bony Bankart repair of his left shoulder  with an excellent result, he was happy with no symptoms until he injured it changing his  son, he reports reaching out to grab some his son and feeling a tearing sensation left shoulder  That was 2 months ago and this has failed to improve with rest and activity modification  The pain is sharp is localized left glenohumeral joint, is worse activity relieved by rest, it is intermittent timing and is not associated with numbness or tingling or fevers and chills            The following portions of the patient's history were reviewed and updated as appropriate: allergies, current medications, past family history, past medical history, past social history, past surgical history and problem list     Review of Systems   Constitutional: Negative for chills and fever  HENT: Negative for hearing loss  Eyes: Negative for visual disturbance  Respiratory: Negative for shortness of breath  Cardiovascular: Negative for chest pain  Gastrointestinal: Negative for abdominal pain  Musculoskeletal:        As reviewed in the HPI   Skin: Negative for rash     Neurological:        As reviewed in the HPI Psychiatric/Behavioral: Negative for agitation  Objective:  Left Shoulder Exam     Tenderness   None    Range of Motion   Active Abduction:                       90  Passive Abduction:                    90  Forward Flexion:                        170  External Rotation:                      30    Muscle Strength   Abduction:            5/5  Internal Rotation:  5/5  External Rotation: 5/5    Tests   Impingement:   Negative  Self:          Negative  Cross Arm:      Negative  Drop Arm:        Negative  Apprehension: Negative    Comments:  Mildly positive Oldhams sign  Pain with resisted abduction at 90° and the arm in the extended plain          Physical Exam   Constitutional: He is oriented to person, place, and time  He appears well-developed and well-nourished  HENT:   Head: Normocephalic and atraumatic  Neck: Normal range of motion  Neck supple  Cardiovascular: Normal rate and regular rhythm  Pulmonary/Chest: Effort normal  No respiratory distress  Abdominal: Soft  He exhibits no distension  Neurological: He is alert and oriented to person, place, and time  Skin: Skin is warm and dry  Psychiatric: He has a normal mood and affect  His behavior is normal    Nursing note and vitals reviewed  I have personally reviewed pertinent films in PACS and my interpretation is as follows      Three views left shoulder show no bony abnormality    Large joint arthrocentesis  Date/Time: 8/7/2018 2:14 PM  Consent given by: patient  Timeout: Immediately prior to procedure a time out was called to verify the correct patient, procedure, equipment, support staff and site/side marked as required   Supporting Documentation  Indications: pain   Procedure Details  Location: shoulder - L glenohumeral  Needle size: 22 G  Ultrasound guidance: no  Approach: posterior  Medications administered: 2 mL bupivacaine 0 25 %; 6 mg betamethasone acetate-betamethasone sodium phosphate 6 (3-3) mg/mL    Patient tolerance: patient tolerated the procedure well with no immediate complications  Dressing:  Sterile dressing applied

## 2018-08-07 NOTE — PATIENT INSTRUCTIONS

## 2018-08-13 ENCOUNTER — EVALUATION (OUTPATIENT)
Dept: PHYSICAL THERAPY | Facility: CLINIC | Age: 38
End: 2018-08-13
Payer: COMMERCIAL

## 2018-08-13 DIAGNOSIS — M25.512 ACUTE PAIN OF LEFT SHOULDER: Primary | ICD-10-CM

## 2018-08-13 PROCEDURE — 97161 PT EVAL LOW COMPLEX 20 MIN: CPT | Performed by: PHYSICAL THERAPIST

## 2018-08-13 PROCEDURE — G8984 CARRY CURRENT STATUS: HCPCS | Performed by: PHYSICAL THERAPIST

## 2018-08-13 PROCEDURE — G8985 CARRY GOAL STATUS: HCPCS | Performed by: PHYSICAL THERAPIST

## 2018-08-13 PROCEDURE — 97110 THERAPEUTIC EXERCISES: CPT | Performed by: PHYSICAL THERAPIST

## 2018-08-13 NOTE — PROGRESS NOTES
PT Evaluation     Today's date: 2018  Patient name: Joan Greenfield  : 1980  MRN: [id number]  Referring provider: Reddy Rodriguez MD  Dx:   Encounter Diagnosis     ICD-10-CM    1  Acute pain of left shoulder M25 512 Ambulatory referral to Physical Therapy     PT plan of care cert/re-cert       Start Time: 1200  Stop Time: 1255  Total time in clinic (min): 55 minutes    Assessment  Impairments: abnormal muscle firing, abnormal muscle tone, abnormal or restricted ROM, abnormal movement, impaired physical strength, lacks appropriate home exercise program, pain with function and poor posture     Assessment details: Patient is a 41 y o  male who presents with s/s consistent with L shoulder pain with contributing subscapularis and supraspinatus strains  Patient presents to evaluation with poor posture, pain , slightly decreased range of motion, decreased strength and decreased tolerance to activity  Patient is currently unable to performed prolonged activity overhead, swim, bike, or play with his 9 month old son without pain  Patient is a good candidate for skilled PT, and would benefit from skilled PT services twice a week for 6 weeks to address these impairments, achieve goals, and to maximize function   Thank you for the referral     Understanding of Dx/Px/POC: good   Prognosis: good    Goals  Impairment Goals 4-6 weeks  - Decrease pain to <3/10  - Improve shoulder AROM to equal to the unaffected upper extremity  - Increase shoulder strength to 5/5 throughout  - Increase scapular strength to 4+/5 throughout    Functional Goals 6-8 weeks  - Return to Prior Level of Function  - Increase Functional Status Measure (FOTO) to: 78  - Patient will be independent with HEP  - Patient will be able to reach to side and lift objects without increased pain/compensation/difficulty  - Patient will be able to reach behind back without increased pain/compensation/difficulty   - Patient will be able to return to swimming without increased pain/compensation/difficulty   - Patient will be able to play with his son without increased pain/compensation/difficulty      Plan  Patient would benefit from: skilled physical therapy  Planned modality interventions: cryotherapy, electrical stimulation/Russian stimulation, high voltage pulsed current: pain management, high voltage pulsed current: spasm management, H-Wave, TENS, thermotherapy: hydrocollator packs and unattended electrical stimulation  Planned therapy interventions: abdominal trunk stabilization, behavior modification, body mechanics training, breathing training, flexibility, functional ROM exercises, home exercise program, joint mobilization, manual therapy, massage, Bey taping, muscle pump exercises, neuromuscular re-education, patient education, postural training, strengthening, stretching, therapeutic activities, therapeutic exercise and therapeutic training  Frequency: 2x week  Duration in weeks: 6  Treatment plan discussed with: patient        Subjective Evaluation    History of Present Illness  Mechanism of injury: WORK: Patient is a , and otherwise unemployed  HOME LIFE: Lives at home with his wife and his 9 month old boy  HOBBIES/EXERCISE: Patient enjoys mountain biking, swimming, and elliptical for exercise  Patient also enjoys hunting and fishing  PLOF: Patient has had surgery to L shoulder in 2010 with a Bankart lesion repair  His L shoulder also occasionally flares up because he gets recurrent bursitis that usually goes away in a few days  HISTORY OF CURRENT INJURY: 2 months ago, patient was reaching down abruptly to the L for his son, and he felt a sharp pain in L shoulder  No audible noises  PAIN LOCATION/DESCRIPTORS: Pain in lateral L shoulder, described as sharp when abducting the arm  AGGRAVATING FACTORS:  Reaching upwards with arm outstretched - abduction   Prolonged positioning in a raised position (driving for prolonged periods)  Pain goes away within 30 seconds when he gets out of that motion  EASES: Avoiding abduction  DAY PATTERN: No pattern  IMAGING: X-rays unremarkable  SPECIAL QUESTIONS: No hx of cancer, no complaints of numbness/tingling/burning, no incontinence, no issues sleeping  PATIENT GOALS: Patient's main goal for therapy is pain relief without drugs, in order to get back to swimming and playing with his son     Pain  Current pain ratin  At best pain ratin  At worst pain ratin  Quality: knife-like and sharp  Progression: improved    Patient Goals  Patient goals for therapy: decreased pain, independence with ADLs/IADLs, return to sport/leisure activities, increased motion and increased strength          Objective     Postural Observations  Seated posture: poor    Additional Postural Observation Details  Patient sits with forward head and rounded shoulders    Palpation     Additional Palpation Details  Tender to palpation of R lateral and anterior shoulder  Tender to palpation of R lateral and anterior shoulder > R but not very painful    Active Range of Motion   Left Shoulder   Flexion: 144 degrees   Abduction: 158 degrees   External rotation BTH: T3   Internal rotation BTB: T11     Right Shoulder   Flexion: 145 degrees   Abduction: 170 degrees   External rotation BTH: T4   Internal rotation BTB: T11     Additional Active Range of Motion Details  No pain with AROM in any direction    Passive Range of Motion   Left Shoulder   Flexion: 180 degrees   External rotation 90°: 85 degrees   Internal rotation 90°: 60 degrees     Right Shoulder   Flexion: 180 degrees   External rotation 90°: 82 degrees   Internal rotation 90°: 45 degrees     Additional Passive Range of Motion Details  No pain with PROM    Strength/Myotome Testing     Left Shoulder     Planes of Motion   Flexion: 4+   Abduction: 4- (astericks sign)   External rotation at 0°: 4+   Internal rotation at 0°: 4+ (pain)     Right Shoulder     Planes of Motion   Flexion: 5   Abduction: 5   External rotation at 0°: 5   Internal rotation at 0°: 5     Tests     Left Shoulder   Positive active compression ([place]), Hawkin's, lift-off and Speed's  Negative apprehension, horn blower, internal rotation lag sign, passive horizontal adduction, Yergason's and posterior apprehension   Right Shoulder   Negative active compression ([place]), Hawkin's, passive horizontal adduction, Speed's and Yergason's         Flowsheet Rows      Most Recent Value   PT/OT G-Codes   Current Score  68   Projected Score  79   FOTO information reviewed  Yes   Assessment Type  Evaluation   G code set  Carrying, Moving & Handling Objects   Carrying, Moving and Handling Objects Current Status ()  CJ   Carrying, Moving and Handling Objects Goal Status ()  CH         Diagnosis: Subsacpularis/supraspinatus strain, upper crossed syndrome   Precautions: Past Bankart lesion repair on L 2010   Manual Therapy 8/6       PROM         Capsular mobs        Scapular mobs        IASTM UT                Exercise Diary         UBE with scap retraction        Scapular retraction X 30 2 sec hold       UT/levator stretch        Pec stretch one arm 3x30 sec       Thoracic ext over chair        Serratus press        Cane supine flx/abd/ER        Gentle sleeper stretch        TB rows        No money's        Posterior shoulder stretch 3x30 sec                                                                                       Modalities        Ice and IFC

## 2018-08-17 ENCOUNTER — OFFICE VISIT (OUTPATIENT)
Dept: PHYSICAL THERAPY | Facility: CLINIC | Age: 38
End: 2018-08-17
Payer: COMMERCIAL

## 2018-08-17 ENCOUNTER — TRANSCRIBE ORDERS (OUTPATIENT)
Dept: LAB | Facility: CLINIC | Age: 38
End: 2018-08-17

## 2018-08-17 DIAGNOSIS — Z00.8 HEALTH EXAMINATION IN POPULATION SURVEY: Primary | ICD-10-CM

## 2018-08-17 DIAGNOSIS — M25.512 ACUTE PAIN OF LEFT SHOULDER: Primary | ICD-10-CM

## 2018-08-17 LAB
CHOLEST SERPL-MCNC: 247 MG/DL (ref 50–200)
HDLC SERPL-MCNC: 42 MG/DL (ref 40–60)
LDLC SERPL CALC-MCNC: 175 MG/DL (ref 0–100)
NONHDLC SERPL-MCNC: 205 MG/DL
TRIGL SERPL-MCNC: 152 MG/DL

## 2018-08-17 PROCEDURE — 83036 HEMOGLOBIN GLYCOSYLATED A1C: CPT

## 2018-08-17 PROCEDURE — 80061 LIPID PANEL: CPT

## 2018-08-17 PROCEDURE — 97014 ELECTRIC STIMULATION THERAPY: CPT | Performed by: PHYSICAL THERAPIST

## 2018-08-17 PROCEDURE — 36415 COLL VENOUS BLD VENIPUNCTURE: CPT

## 2018-08-17 PROCEDURE — 97112 NEUROMUSCULAR REEDUCATION: CPT | Performed by: PHYSICAL THERAPIST

## 2018-08-17 PROCEDURE — 97110 THERAPEUTIC EXERCISES: CPT | Performed by: PHYSICAL THERAPIST

## 2018-08-17 PROCEDURE — 97140 MANUAL THERAPY 1/> REGIONS: CPT | Performed by: PHYSICAL THERAPIST

## 2018-08-17 PROCEDURE — G0283 ELEC STIM OTHER THAN WOUND: HCPCS | Performed by: PHYSICAL THERAPIST

## 2018-08-17 NOTE — PROGRESS NOTES
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  No complaints of pain during session  Objective: See treatment diary below    Diagnosis: L Subsacpularis/supraspinatus strain, upper crossed syndrome   Precautions: Past Bankart lesion repair on L    Manual Therapy       PROM   5      Capsular mobs  5      Scapular mobs        IASTM UT                Exercise Diary         UBE with scap retraction  3 min F/B      Scapular retraction* X 30 2 sec hold X 30 2 sec holds      UT/levator stretch  3x30 sec ea      Pec stretch one arm* 3x30 sec 3x30 sec      Thoracic 3 way stretch in chair  X 10      Serratus press  5# x 30      Alt flx/abd/ER        Gentle sleeper stretch  3x30 sec      TB rows/ext        TB abd/flx        TB IR/ER        No money's        Posterior shoulder stretch* 3x30 sec HEP      IR behind back stretch  30 sec x 3      Prone scapular program  Scap retraction only x 30      Prone Ws        Wall washes                                                        Modalities        Ice and IFC  TENS and ice 10 min                          Assessment: Tolerated treatment well  Patient exhibited good technique with therapeutic exercises and would benefit from continued PT  Patient demonstrates good flexibility and L shoulder and thoracic ROM with stretching and mobilizations, indicating he may be able to progress to strengthening quickly   He demonstrates good body awareness and is able to corrrect posture and form with minimal cuing  Plan: Continue per plan of care

## 2018-08-18 LAB
EST. AVERAGE GLUCOSE BLD GHB EST-MCNC: 108 MG/DL
HBA1C MFR BLD: 5.4 % (ref 4.2–6.3)

## 2018-08-21 ENCOUNTER — OFFICE VISIT (OUTPATIENT)
Dept: PHYSICAL THERAPY | Facility: CLINIC | Age: 38
End: 2018-08-21
Payer: COMMERCIAL

## 2018-08-21 DIAGNOSIS — M25.512 ACUTE PAIN OF LEFT SHOULDER: Primary | ICD-10-CM

## 2018-08-21 PROCEDURE — G0283 ELEC STIM OTHER THAN WOUND: HCPCS | Performed by: PHYSICAL THERAPIST

## 2018-08-21 PROCEDURE — 97140 MANUAL THERAPY 1/> REGIONS: CPT | Performed by: PHYSICAL THERAPIST

## 2018-08-21 PROCEDURE — 97110 THERAPEUTIC EXERCISES: CPT | Performed by: PHYSICAL THERAPIST

## 2018-08-21 PROCEDURE — 97014 ELECTRIC STIMULATION THERAPY: CPT | Performed by: PHYSICAL THERAPIST

## 2018-08-21 PROCEDURE — 97112 NEUROMUSCULAR REEDUCATION: CPT | Performed by: PHYSICAL THERAPIST

## 2018-08-21 NOTE — PROGRESS NOTES
Daily Note     Today's date: 2018  Patient name: Willy Benton  : 1980  MRN: [id number]  Referring provider: Bo Landers MD  Dx:   Encounter Diagnosis     ICD-10-CM    1  Acute pain of left shoulder M25 512        Start Time: 1045  Stop Time: 1140  Total time in clinic (min): 55 minutes    Subjective: Patient reports his L shoulder bursitis pain was nonexistent until he woke up  morning sleeping on it  He continues to have residual bursitis pain today 2/10  No complaints of sharp pains when reaching lately  Objective: See treatment diary below    Diagnosis: L Subsacpularis/supraspinatus strain, upper crossed syndrome   Precautions: Past Bankart lesion repair on L    Manual Therapy      PROM   5      Capsular mobs  5      Scapular mobs        IASTM UT                Exercise Diary         UBE with scap retraction  3 min F/B 3 min F/B     Scapular retraction* X 30 2 sec hold X 30 2 sec holds HEP     UT/levator stretch  3x30 sec ea HEP     Pec stretch one arm* 3x30 sec 3x30 sec 3x30 sec     Thoracic 3 way stretch in chair  X 10      Serratus press  5# x 30      Alt flx/abd/ER        Gentle sleeper stretch  3x30 sec      TB rows/ext   X 30 blue TB     TB abd/flx        TB IR/ER        No money's   X 30 blue     Posterior shoulder stretch* 3x30 sec HEP HEP     IR behind back stretch  30 sec x 3 30 sec x 3     Prone scapular program  Scap retraction only x 30      Prone Ws        Wall washes   3x 30 sec up/down and side to side     Scap stab red TB        Ball bounces on wall   3x30 sec green ball     Body blade   3x30 sec 0 and 90                             Modalities        Ice and IFC  TENS and ice 10 min TENS and ice 10 min                         Assessment: Tolerated treatment well  Patient exhibited good technique with therapeutic exercises and would benefit from continued PT   Progressed scapular and RTC strengthening and stabilization exercises this date without complaints of L shoulder pain, but some fatigue overall  Patient will benefit from continued PT addressing scapular stabilizers and RTC  Plan: Continue per plan of care

## 2018-08-23 ENCOUNTER — OFFICE VISIT (OUTPATIENT)
Dept: PHYSICAL THERAPY | Facility: CLINIC | Age: 38
End: 2018-08-23
Payer: COMMERCIAL

## 2018-08-23 DIAGNOSIS — M25.512 ACUTE PAIN OF LEFT SHOULDER: Primary | ICD-10-CM

## 2018-08-23 PROCEDURE — 97014 ELECTRIC STIMULATION THERAPY: CPT | Performed by: PHYSICAL THERAPIST

## 2018-08-23 PROCEDURE — G0283 ELEC STIM OTHER THAN WOUND: HCPCS | Performed by: PHYSICAL THERAPIST

## 2018-08-23 PROCEDURE — 97112 NEUROMUSCULAR REEDUCATION: CPT | Performed by: PHYSICAL THERAPIST

## 2018-08-23 PROCEDURE — 97110 THERAPEUTIC EXERCISES: CPT | Performed by: PHYSICAL THERAPIST

## 2018-08-23 PROCEDURE — 97140 MANUAL THERAPY 1/> REGIONS: CPT | Performed by: PHYSICAL THERAPIST

## 2018-08-23 NOTE — PROGRESS NOTES
Daily Note     Today's date: 2018  Patient name: Raúl Luciano  : 1980  MRN: [id number]  Referring provider: An Lara MD  Dx:   Encounter Diagnosis     ICD-10-CM    1  Acute pain of left shoulder M25 512        Start Time: 1435  Stop Time: 1530  Total time in clinic (min): 55 minutes    Subjective: Patient reports yesterday he had to reach really far to catch a boat and a dock, and felt the sharp pain he came into therapy for  He is sore today, but has no pain at rest  It loosens up with exercise  Objective: See treatment diary below    Diagnosis: L Subsacpularis/supraspinatus strain, upper crossed syndrome   Precautions: Past Bankart lesion repair on L    Manual Therapy     PROM   5  8    Capsular mobs  5  2    Scapular mobs        IASTM UT                Exercise Diary         UBE with scap retraction  3 min F/B 3 min F/B 3 min F/B 2 0 resistance    Scapular retraction* X 30 2 sec hold X 30 2 sec holds HEP HEP    UT/levator stretch  3x30 sec ea HEP HEP    Pec stretch one arm* 3x30 sec 3x30 sec 3x30 sec HEP    Thoracic 3 way stretch in chair  X 10      Serratus press  5# x 30  8# x 30    Alt flx/abd/ER        Gentle sleeper stretch  3x30 sec      TB rows/ext   X 30 blue TB X 30 black TB    TB abd/flx    X 30 red TB    TB IR/ER        No money's   X 30 blue     Posterior shoulder stretch* 3x30 sec HEP HEP HEP    IR behind back stretch  30 sec x 3 30 sec x 3 30 sec x 3    Prone scapular program  Scap retraction only x 30  ITY x 10 with scapular retraction    Prone Ws        Wall washes   3x 30 sec up/down and side to side     Scap stab red TB        Ball bounces on wall   3x30 sec green ball 3x30 sec green ball    Body blade   3x30 sec 0 and 90                             Modalities        Ice and IFC  TENS and ice 10 min TENS and ice 10 min TENS and ice 10 min                        Assessment: Tolerated treatment well   Patient demonstrated fatigue post treatment and exhibited good technique with therapeutic exercises  Added prone ITY with scapular retraction which challenged Lindsey Valdez  He was able to tolerate scapular stabilization exercises with some complaints of fatigue in L shoulder  Patient is progressing towards short and long term goals  Patient reports his shoulder felt better after therapy session  Plan: Progress treatment as tolerated

## 2018-08-30 ENCOUNTER — OFFICE VISIT (OUTPATIENT)
Dept: PHYSICAL THERAPY | Facility: CLINIC | Age: 38
End: 2018-08-30
Payer: COMMERCIAL

## 2018-08-30 DIAGNOSIS — M25.512 ACUTE PAIN OF LEFT SHOULDER: Primary | ICD-10-CM

## 2018-08-30 PROCEDURE — G0283 ELEC STIM OTHER THAN WOUND: HCPCS | Performed by: PHYSICAL THERAPIST

## 2018-08-30 PROCEDURE — G8985 CARRY GOAL STATUS: HCPCS | Performed by: PHYSICAL THERAPIST

## 2018-08-30 PROCEDURE — G8984 CARRY CURRENT STATUS: HCPCS | Performed by: PHYSICAL THERAPIST

## 2018-08-30 PROCEDURE — 97140 MANUAL THERAPY 1/> REGIONS: CPT | Performed by: PHYSICAL THERAPIST

## 2018-08-30 PROCEDURE — 97110 THERAPEUTIC EXERCISES: CPT | Performed by: PHYSICAL THERAPIST

## 2018-08-30 PROCEDURE — 97112 NEUROMUSCULAR REEDUCATION: CPT | Performed by: PHYSICAL THERAPIST

## 2018-08-30 PROCEDURE — 97014 ELECTRIC STIMULATION THERAPY: CPT | Performed by: PHYSICAL THERAPIST

## 2018-08-30 NOTE — PROGRESS NOTES
Daily Note     Today's date: 2018  Patient name: Jessica Borges  : 1980  MRN: [id number]  Referring provider: Geovany Villegas MD  Dx:   Encounter Diagnosis     ICD-10-CM    1  Acute pain of left shoulder M25 512        Start Time: 0750  Stop Time: 0845  Total time in clinic (min): 55 minutes    Subjective: Patient reports he did not sleep well last night, but his shoulder is doing fine  He had vacation and he did too many bicep curls and slept on his L shoulder and he had a lot of pain  Patient feels "pretty good" today with only minor soreness        Objective: See treatment diary below    Diagnosis: L Subsacpularis/supraspinatus strain, upper crossed syndrome   Precautions: Past Bankart lesion repair on L    Manual Therapy    PROM   5  8 8   Capsular mobs  5  2    Scapular mobs        IASTM UT                Exercise Diary         UBE with scap retraction  3 min F/B 3 min F/B 3 min F/B 2 0 resistance 3 min F/B   Scapular retraction* X 30 2 sec hold X 30 2 sec holds HEP HEP    UT/levator stretch  3x30 sec ea HEP HEP    Pec stretch one arm* 3x30 sec 3x30 sec 3x30 sec HEP    Thoracic 3 way stretch in chair  X 10      Serratus press  5# x 30  8# x 30 10# x 30   Alt flx/abd/ER        Gentle sleeper stretch  3x30 sec      TB rows/ext   X 30 blue TB X 30 black TB    TB abd/flx    X 30 red TB    TB IR/ER        No money's   X 30 blue  X 30 blue   Posterior shoulder stretch* 3x30 sec HEP HEP HEP    IR behind back stretch  30 sec x 3 30 sec x 3 30 sec x 3 30 sec x 3   Prone scapular program  Scap retraction only x 30  ITY x 10 with scapular retraction ITY x 10 1# with scapular retraction   Prone Ws     X 20   Wall washes   3x 30 sec up/down and side to side  [id number] up and down side to side   Scap stab red TB        Ball bounces on wall   3x30 sec green ball 3x30 sec green ball 3x30 sec green ball   Body blade   3x30 sec 0 and 90     Shelf lifts                        Modalities Ice and IFC  TENS and ice 10 min TENS and ice 10 min TENS and ice 10 min IFC and ice 10 min                       Assessment: Tolerated treatment well  Patient exhibited good technique with therapeutic exercises and reassessed the FOTO with improvement to 80  Patient was able to tolerate scapular strengthening well with muscular fatigue but no increase in pain  Scheduled re-evaluation for next Friday  Plan: Continue per plan of care

## 2018-08-31 ENCOUNTER — OFFICE VISIT (OUTPATIENT)
Dept: PHYSICAL THERAPY | Facility: CLINIC | Age: 38
End: 2018-08-31
Payer: COMMERCIAL

## 2018-08-31 DIAGNOSIS — M25.512 ACUTE PAIN OF LEFT SHOULDER: Primary | ICD-10-CM

## 2018-08-31 PROCEDURE — G0283 ELEC STIM OTHER THAN WOUND: HCPCS | Performed by: PHYSICAL THERAPIST

## 2018-08-31 PROCEDURE — 97140 MANUAL THERAPY 1/> REGIONS: CPT | Performed by: PHYSICAL THERAPIST

## 2018-08-31 PROCEDURE — 97014 ELECTRIC STIMULATION THERAPY: CPT | Performed by: PHYSICAL THERAPIST

## 2018-08-31 PROCEDURE — 97110 THERAPEUTIC EXERCISES: CPT | Performed by: PHYSICAL THERAPIST

## 2018-08-31 PROCEDURE — 97112 NEUROMUSCULAR REEDUCATION: CPT | Performed by: PHYSICAL THERAPIST

## 2018-08-31 NOTE — PROGRESS NOTES
Daily Note     Today's date: 2018  Patient name: Jose Roberto Rios  : 1980  MRN: [id number]  Referring provider: Kellee Valderrama MD  Dx:   Encounter Diagnosis     ICD-10-CM    1  Acute pain of left shoulder M25 512        Start Time: 1055  Stop Time: 1150  Total time in clinic (min): 55 minutes    Subjective: Patient reports his L shoulder is not feeling bad today, and he does not notice any pain  He is very busy in life at the moment and does not think about his shoulder much         Objective: See treatment diary below    Diagnosis: L Subsacpularis/supraspinatus strain, upper crossed syndrome   Precautions: Past Bankart lesion repair on L    Manual Therapy    PROM  8 5  8 8   Capsular mobs  5  2    Scapular mobs        IASTM UT                Exercise Diary         UBE with scap retraction 3 min F/B  4 0 resistance 3 min F/B 3 min F/B 3 min F/B 2 0 resistance 3 min F/B    Scapular retraction*  X 30 2 sec holds HEP HEP    UT/levator stretch  3x30 sec ea HEP HEP    Pec stretch one arm*  3x30 sec 3x30 sec HEP    Thoracic 3 way stretch in chair  X 10      Serratus press 15# x 30 5# x 30  8# x 30 10# x 30   Alt flx/abd/ER        Gentle sleeper stretch  3x30 sec      TB rows/ext X 30 black  X 30 blue TB X 30 black TB    TB abd/flx X 30 red TB   X 30 red TB    TB IR/ER Green TB x 30 ea       No money's   X 30 blue  X 30 blue   Posterior shoulder stretch*  HEP HEP HEP    IR behind back stretch 30 sec x 3 30 sec x 3 30 sec x 3 30 sec x 3 30 sec x 3   Prone scapular program ITY x 15 2# with scapular retraction Scap retraction only x 30  ITY x 10 with scapular retraction ITY x 10 1# with scapular retraction   Prone Ws X 30    X 20   Wall washes [id number] up and down side to side  3x 30 sec up/down and side to side  [id number] up and down side to side   Scap stab red TB X 10        Ball bounces on wall 3x30 sec red ball  3x30 sec green ball 3x30 sec green ball 3x30 sec green ball   Body blade 3x30 sec 0 and 90     Shelf lifts                        Modalities        Ice and IFC IFC and ice 10 min TENS and ice 10 min TENS and ice 10 min TENS and ice 10 min IFC and ice 10 min                       Assessment: Tolerated treatment well  Patient exhibited good technique with therapeutic exercises and would benefit from continued PT  Patient continues to fatigue with overhead exercises and scapular stabilization, but is otherwise tolerating progressions well  His ROM is equal to opposite UE at this time  Re-evaluation scheduled for the end of next week  Plan: Continue per plan of care

## 2018-09-06 ENCOUNTER — EVALUATION (OUTPATIENT)
Dept: PHYSICAL THERAPY | Facility: CLINIC | Age: 38
End: 2018-09-06
Payer: COMMERCIAL

## 2018-09-06 DIAGNOSIS — M25.512 ACUTE PAIN OF LEFT SHOULDER: Primary | ICD-10-CM

## 2018-09-06 PROCEDURE — G8985 CARRY GOAL STATUS: HCPCS | Performed by: PHYSICAL THERAPIST

## 2018-09-06 PROCEDURE — 97112 NEUROMUSCULAR REEDUCATION: CPT | Performed by: PHYSICAL THERAPIST

## 2018-09-06 PROCEDURE — G8986 CARRY D/C STATUS: HCPCS | Performed by: PHYSICAL THERAPIST

## 2018-09-06 NOTE — PROGRESS NOTES
PT Discharge    Today's date: 2018  Patient name: Jose Roberto Rios  : 1980  MRN: [id number]  Referring provider: Kellee Valderrama MD  Dx:   Encounter Diagnosis     ICD-10-CM    1  Acute pain of left shoulder M25 [id number]        Start Time: 915  Stop Time: 1000  Total time in clinic (min): 45 minutes    Assessment  Impairments: impaired physical strength    Assessment details: Jose Roberto Rios has been compliant with attending physical therapy and completing home exercise program since initial evaluation  Marcellus Bojorquez  has made improvements in objective data since initial evalulation and has achieved all goals  Patient reports having returned to their prior level of function  Patient provided with updated Home Exercise Program, all questions answered, and verbalized understanding, agreeing to plan of care   Thus it was mutually decided to discontinue this episode of care and transition to Home Exercise Program        Understanding of Dx/Px/POC: good   Prognosis: good    Goals  Impairment Goals 4-6 weeks  - Decrease pain to <3/10 - met  - Improve shoulder AROM to equal to the unaffected upper extremity - met  - Increase shoulder strength to 5/5 throughout - met  - Increase scapular strength to 4+/5 throughout - partially met    Functional Goals 6-8 weeks  - Return to Prior Level of Function - met  - Increase Functional Status Measure (FOTO) to: 78 - met  - Patient will be independent with HEP - met  - Patient will be able to reach to side and lift objects without increased pain/compensation/difficulty - met  - Patient will be able to reach behind back without increased pain/compensation/difficulty - met  - Patient will be able to return to swimming without increased pain/compensation/difficulty - partially met   - Patient will be able to play with his son without increased pain/compensation/difficulty - met      Plan  Planned therapy interventions: home exercise program  Treatment plan discussed with: patient        Subjective Evaluation    History of Present Illness  Mechanism of injury: WORK: Patient is a , and otherwise unemployed  HOME LIFE: Lives at home with his wife and his 9 month old boy  HOBBIES/EXERCISE: Patient enjoys mountain biking, swimming, and elliptical for exercise  Patient also enjoys hunting and fishing  PLOF: Patient has had surgery to L shoulder in  with a Bankart lesion repair  His L shoulder also occasionally flares up because he gets recurrent bursitis that usually goes away in a few days  Re-evaluation  HISTORY OF CURRENT INJURY: Patient reports his shoulder feels "good" and it is improving  PAIN LOCATION/DESCRIPTORS: Pain in superior/anterior shoulder where his bursa acts up  He no longer has any sharp pains with any motions of the shoulder  AGGRAVATING FACTORS: Sleeping on the L shoulder, propped on elbows for extended periods  Improved: Reaching upwards with arm outstretched - abduction  Prolonged positioning in a raised position (driving for prolonged periods)  EASES: Stretching, HEP, icing  DAY PATTERN: No pattern  IMAGING: X-rays unremarkable  SPECIAL QUESTIONS: No hx of cancer, no complaints of numbness/tingling/burning, no incontinence, no issues sleeping  PATIENT GOALS: Patient's main goal for therapy is pain relief without drugs, in order to get back to swimming and playing with his son  - Patient reports himself 90% improved     Pain  Current pain ratin  At best pain ratin  At worst pain ratin  Quality: dull ache  Progression: improved    Patient Goals  Patient goals for therapy: decreased pain, return to sport/leisure activities and increased strength          Objective     Postural Observations  Seated posture: fair  Standing posture: fair  Correction of posture: makes symptoms better    Additional Postural Observation Details  Patient sits  slight forward head and rounded shoulders, improved postural awareness    Palpation Additional Palpation Details  No longer tender to palpation of L shoulder over any tendons  Active Range of Motion   Left Shoulder   Flexion: 148 degrees   Abduction: 170 degrees   External rotation BTH: T3   Internal rotation BTB: T9     Right Shoulder   Flexion: 145 degrees   Abduction: 170 degrees   External rotation BTH: T4   Internal rotation BTB: T11     Additional Active Range of Motion Details  No pain with AROM in any direction    Passive Range of Motion   Left Shoulder   Flexion: 180 degrees   External rotation 90°: 85 degrees   Internal rotation 90°: 70 degrees     Right Shoulder   Flexion: 180 degrees   External rotation 90°: 82 degrees   Internal rotation 90°: 45 degrees     Additional Passive Range of Motion Details  No pain with PROM    Strength/Myotome Testing     Left Shoulder     Planes of Motion   Flexion: 5   Abduction: 4+ (some discomfort)   External rotation at 0°: 5   Internal rotation at 0°: 5     Isolated Muscles   Lower trapezius: 4+   Middle trapezius: 5   Upper trapezius: 5     Right Shoulder     Planes of Motion   Flexion: 5   Abduction: 5   External rotation at 0°: 5   Internal rotation at 0°: 5     Isolated Muscles   Lower trapezius: 4   Middle trapezius: 5   Upper trapezius: 5     Tests     Left Shoulder   Negative active compression ([place]), apprehension, Hawkin's, horn blower, internal rotation lag sign, lift-off, passive horizontal adduction, Speed's, Yergason's and posterior apprehension   Right Shoulder   Negative active compression ([place]), Hawkin's, passive horizontal adduction, Speed's and Yergason's       Additional Tests Details  All special tests negative this date    Diagnosis: L Subsacpularis/supraspinatus strain, upper crossed syndrome   Precautions: Past Bankart lesion repair on L 2010   Manual Therapy 8/31 9/6 8/21 8/23 8/30   PROM  8   8 8   Capsular mobs    2    Scapular mobs        IASTM UT                Exercise Diary         UBE with scap retraction 3 min F/B  4 0 resistance  3 min F/B 3 min F/B 2 0 resistance 3 min F/B    Scapular retraction*   HEP HEP    UT/levator stretch   HEP HEP    Pec stretch one arm*   3x30 sec HEP    Thoracic 3 way stretch in chair        Serratus press 15# x 30   8# x 30 10# x 30   Alt flx/abd/ER        Gentle sleeper stretch        TB rows/ext X 30 black  X 30 blue TB X 30 black TB    TB abd/flx X 30 red TB   X 30 red TB    TB IR/ER Green TB x 30 ea       No money's   X 30 blue  X 30 blue   Posterior shoulder stretch*   HEP HEP    IR behind back stretch 30 sec x 3  30 sec x 3 30 sec x 3 30 sec x 3   Prone scapular program ITY x 15 2# with scapular retraction   ITY x 10 with scapular retraction ITY x 10 1# with scapular retraction   Prone Ws X 30    X 20   Wall washes [id number] up and down side to side  3x 30 sec up/down and side to side  [id number] up and down side to side   Scap stab red TB X 10        Ball bounces on wall 3x30 sec red ball  3x30 sec green ball 3x30 sec green ball 3x30 sec green ball   Body blade   3x30 sec 0 and 90     Shelf lifts                Re-evaluation, discharge, HEP  Performed      Modalities        Ice and IFC IFC and ice 10 min  TENS and ice 10 min TENS and ice 10 min IFC and ice 10 min                     Flowsheet Rows      Most Recent Value   PT/OT G-Codes   Current Score  100   Projected Score  79   FOTO information reviewed  Yes   Assessment Type  Discharge   G code set  Carrying, Moving & Handling Objects   Carrying, Moving and Handling Objects Goal Status ()  [address] [place] [address]   Carrying, Moving and Handling Objects Discharge Status ()  [address] [place] [address]

## 2018-09-07 ENCOUNTER — APPOINTMENT (OUTPATIENT)
Dept: PHYSICAL THERAPY | Facility: CLINIC | Age: 38
End: 2018-09-07
Payer: COMMERCIAL

## 2018-09-18 ENCOUNTER — OFFICE VISIT (OUTPATIENT)
Dept: GASTROENTEROLOGY | Facility: AMBULARY SURGERY CENTER | Age: 38
End: 2018-09-18
Payer: COMMERCIAL

## 2018-09-18 VITALS
TEMPERATURE: 98.4 F | HEIGHT: 67 IN | HEART RATE: 75 BPM | BODY MASS INDEX: 48.81 KG/M2 | DIASTOLIC BLOOD PRESSURE: 86 MMHG | WEIGHT: 311 LBS | SYSTOLIC BLOOD PRESSURE: 152 MMHG

## 2018-09-18 DIAGNOSIS — K21.9 GASTROESOPHAGEAL REFLUX DISEASE, ESOPHAGITIS PRESENCE NOT SPECIFIED: ICD-10-CM

## 2018-09-18 DIAGNOSIS — K21.9 GASTROESOPHAGEAL REFLUX DISEASE WITHOUT ESOPHAGITIS: Primary | ICD-10-CM

## 2018-09-18 PROCEDURE — 99213 OFFICE O/P EST LOW 20 MIN: CPT | Performed by: INTERNAL MEDICINE

## 2018-09-18 RX ORDER — OMEPRAZOLE 20 MG/1
20 CAPSULE, DELAYED RELEASE ORAL DAILY
Qty: 30 CAPSULE | Refills: 11 | Status: SHIPPED | OUTPATIENT
Start: 2018-09-18 | End: 2018-10-08 | Stop reason: ALTCHOICE

## 2018-09-18 RX ORDER — FAMOTIDINE 20 MG/1
20 TABLET, FILM COATED ORAL 2 TIMES DAILY
Qty: 60 TABLET | Refills: 11 | Status: SHIPPED | OUTPATIENT
Start: 2018-09-18

## 2018-09-18 NOTE — PROGRESS NOTES
Follow-up Note -  [facility]  Benton Burger 1980 male         Reason:  Follow-up regarding GERD    HPI:  Mr Jordan Humphrey came in for regular follow-up  He has history of GERD  He had upper endoscopy couple of years ago which showed small hiatal hernia with a tiny columnar mucosal extension but the biopsies were benign and negative for Dewitt's  He has been doing well on omeprazole regularly  He reports having occasional symptoms for few days and could not correlate with any particular diet  Good appetite, no recent weight loss  Regular bowel movements and denies any blood or mucus in the stool  Denies any difficulty swallowing  REVIEW OF SYSTEMS: Review of Systems   Constitutional: Negative for activity change, appetite change, chills, diaphoresis, fatigue, fever and unexpected weight change  HENT: Negative for ear discharge, ear pain, facial swelling, hearing loss, nosebleeds, sore throat, tinnitus and voice change  Eyes: Negative for pain, discharge, redness, itching and visual disturbance  Respiratory: Negative for apnea, cough, chest tightness, shortness of breath and wheezing  Cardiovascular: Negative for chest pain and palpitations  Gastrointestinal:        As noted in HPI   Endocrine: Negative for cold intolerance, heat intolerance and polyuria  Genitourinary: Negative for difficulty urinating, dysuria, flank pain, hematuria and urgency  Musculoskeletal: Negative for arthralgias, back pain, gait problem, joint swelling and myalgias  Skin: Negative for rash and wound  Neurological: Negative for dizziness, tremors, seizures, speech difficulty, light-headedness, numbness and headaches  Hematological: Negative for adenopathy  Does not bruise/bleed easily  Psychiatric/Behavioral: Negative for agitation, behavioral problems and confusion  The patient is not nervous/anxious           Past Medical History:   Diagnosis Date    Acid reflux     History of viral meningitis     Varicose veins of both lower extremities       Past Surgical History:   Procedure Laterality Date    KNEE ARTHROSCOPY W/ LATERAL RELEASE Right     OH ENDOVENOUS LASER, 1ST VEIN Right 6/13/2017    Procedure: GSV EVLT W/ STAB PHLEBECTOMIES ;  Surgeon: Jonatan Mitchell MD;  Location: [facility];  Service: Vascular    OH ESOPHAGOGASTRODUODENOSCOPY TRANSORAL DIAGNOSTIC N/A 7/19/2016    Procedure: ESOPHAGOGASTRODUODENOSCOPY (EGD); Surgeon: Abraham Elliott MD;  Location: [facility]; Service: Gastroenterology    SHOULDER ARTHROSCOPY W/ LABRAL REPAIR Left      Social History     Social History    Marital status: /Civil Union     Spouse name: N/A    Number of children: N/A    Years of education: N/A     Occupational History    Not on file  Social History Main Topics    Smoking status: Never Smoker    Smokeless tobacco: Never Used    Alcohol use Yes      Comment: social, weekends     Drug use: No    Sexual activity: Not on file     Other Topics Concern    Not on file     Social History Narrative    No narrative on file     History reviewed  No pertinent family history  Patient has no known allergies    Current Outpatient Prescriptions   Medication Sig Dispense Refill    albuterol (VENTOLIN HFA) 90 mcg/act inhaler Ventolin HFA 90 mcg/actuation aerosol inhaler      aspirin (ECOTRIN LOW STRENGTH) 81 mg EC tablet Take by mouth daily      glucosamine-chondroitin 500-400 MG tablet Take 1 tablet by mouth 3 (three) times a day      Multiple Vitamins-Minerals (DAILY MULTIVITAMIN PO) Take 1 tablet by mouth daily      Omega-3 Fatty Acids (FISH OIL) 645 MG CAPS Take by mouth      omeprazole (PriLOSEC) 20 mg delayed release capsule Take 1 capsule (20 mg total) by mouth daily 30 capsule 11    VITAMIN D, CHOLECALCIFEROL, PO Take by mouth      famotidine (PEPCID) 20 mg tablet Take 1 tablet (20 mg total) by mouth 2 (two) times a day 60 tablet 11     No current facility-administered medications for this visit  Blood pressure 152/86, pulse 75, temperature 98 4 °F (36 9 °C), temperature source Tympanic, height 5' 6 5" (1 689 m), weight (!) 141 kg (311 lb)  PHYSICAL EXAM: Physical Exam   Constitutional: He is oriented to person, place, and time  He appears well-developed  HENT:   Head: Normocephalic and atraumatic  Mouth/Throat: Oropharynx is clear and moist    Eyes: Conjunctivae are normal  Pupils are equal, round, and reactive to light  Right eye exhibits no discharge  Left eye exhibits no discharge  No scleral icterus  Neck: Neck supple  No JVD present  No tracheal deviation present  No thyromegaly present  Cardiovascular: Normal rate, regular rhythm, normal heart sounds and intact distal pulses  Exam reveals no gallop and no friction rub  No murmur heard  Pulmonary/Chest: Effort normal and breath sounds normal  No respiratory distress  He has no wheezes  He has no rales  He exhibits no tenderness  Abdominal: Soft  Bowel sounds are normal  He exhibits no distension and no mass  There is no tenderness  There is no rebound and no guarding  No hernia  Musculoskeletal: He exhibits no edema  Lymphadenopathy:     He has no cervical adenopathy  Neurological: He is alert and oriented to person, place, and time  Skin: Skin is warm and dry  No rash noted  No erythema  Psychiatric: He has a normal mood and affect  His behavior is normal  Thought content normal         Lab Results   Component Value Date    WBC 3 67 (L) 05/26/2017    HGB 15 7 05/26/2017    HCT 45 2 05/26/2017    MCV 94 05/26/2017     05/26/2017     Lab Results   Component Value Date    GLUCOSE 90 04/10/2015    CALCIUM 9 4 07/18/2017     07/18/2017    K 4 3 07/18/2017    CO2 27 07/18/2017     07/18/2017    BUN 11 07/18/2017    CREATININE 1 23 07/18/2017     No results found for: INR, PROTIME    No results found      ASSESSMENT & PLAN:    Gastroesophageal reflux disease  History of chronic GERD and has been doing well on omeprazole except occasional symptoms     -Patient was explained about the lifestyle and dietary modifications  Advised to avoid fatty foods, chocolates, caffeine, alcohol and any other triggering foods  Avoid eating for at least 3 hours before going to bed         -discussed with him in detail about the long-term side effects from omeprazole and advised to change to Pepcid twice daily  Prescription was sent for Pepcid  -he may take short courses of omeprazole for any acute symptoms      -schedule for upper endoscopy next year    -advised him to call for any recurrent symptoms

## 2018-09-18 NOTE — ASSESSMENT & PLAN NOTE
History of chronic GERD and has been doing well on omeprazole except occasional symptoms     -Patient was explained about the lifestyle and dietary modifications  Advised to avoid fatty foods, chocolates, caffeine, alcohol and any other triggering foods  Avoid eating for at least 3 hours before going to bed         -discussed with him in detail about the long-term side effects from omeprazole and advised to change to Pepcid twice daily  Prescription was sent for Pepcid  -he may take short courses of omeprazole for any acute symptoms      -schedule for upper endoscopy next year    -advised him to call for any recurrent symptoms

## 2018-09-20 ENCOUNTER — OFFICE VISIT (OUTPATIENT)
Dept: OBGYN CLINIC | Facility: OTHER | Age: 38
End: 2018-09-20
Payer: COMMERCIAL

## 2018-09-20 VITALS
WEIGHT: 295 LBS | DIASTOLIC BLOOD PRESSURE: 82 MMHG | BODY MASS INDEX: 46.9 KG/M2 | HEART RATE: 76 BPM | SYSTOLIC BLOOD PRESSURE: 122 MMHG

## 2018-09-20 DIAGNOSIS — M75.52 SUBACROMIAL BURSITIS OF LEFT SHOULDER JOINT: ICD-10-CM

## 2018-09-20 DIAGNOSIS — M25.512 ACUTE PAIN OF LEFT SHOULDER: Primary | ICD-10-CM

## 2018-09-20 PROCEDURE — 99213 OFFICE O/P EST LOW 20 MIN: CPT | Performed by: ORTHOPAEDIC SURGERY

## 2018-09-20 NOTE — PROGRESS NOTES
Assessment  Problem List Items Addressed This Visit     Acute pain of left shoulder - Primary    Relevant Orders    Ambulatory referral to Physical Therapy    Subacromial bursitis of left shoulder joint    Relevant Orders    Ambulatory referral to Physical Therapy          Discussion and Plan:    Patient's previous symptoms have resolved, with new re-occurrence of subacromial pain  Discussed risks and benefits of subacromial injection  Patient agrees that as his symptoms are largely manageable, will decline at this time  Advised to restart physical therapy targeted at treating his bursitis, which he is in agreement  Follow up as needed  Subjective:   Patient ID: Joan Greenfield is a 41 y o  male      HPI  Patient is a pleasant 40 yo M presenting for follow up left shoulder pain x 3 months  Last seen 8/7/18 and was given a glenohumeral injection for labrum vs rotator cuff pathology  He went to physical therapy for a few weeks and is now performing exercises at home  Today, he notes significant improvement in prior symptoms, however now notes re-occurrence of subacromial bursitis symptoms from carrying his son  Worse with holding son in left arm  Notes he has good and bad days; takes Aleve intermittently for pain  He denies numbness, tingling  H/o left shoulder arthroscopic bony Bankart repair in 2010  The following portions of the patient's history were reviewed and updated as appropriate: allergies, current medications, past family history, past medical history, past social history, past surgical history and problem list     Review of Systems   Constitutional: Negative  HENT: Negative  Eyes: Negative  Respiratory: Negative  Cardiovascular: Negative  Gastrointestinal: Negative  Genitourinary: Negative  Musculoskeletal:        As above in HPI     Neurological: Negative  Psychiatric/Behavioral: Negative          Objective:  Left Shoulder Exam     Range of Motion   Active Abduction: Normal  Passive Abduction:                    Normal  Forward Flexion:                        180  External Rotation:                      80    Muscle Strength   External Rotation: 5/5  Supraspinatus:     5/5  Biceps:                 5/5    Tests   Impingement:   Positive  Self:          Positive          Physical Exam   Constitutional: He is oriented to person, place, and time  He appears well-developed and well-nourished  No distress  HENT:   Head: Normocephalic and atraumatic  Eyes: Right eye exhibits no discharge  Left eye exhibits no discharge  Pulmonary/Chest: Effort normal  No respiratory distress  Neurological: He is alert and oriented to person, place, and time  No cranial nerve deficit  Skin: He is not diaphoretic  Psychiatric: He has a normal mood and affect  His behavior is normal  Judgment and thought content normal    Nursing note and vitals reviewed  I personally examined the patient and reviewed the history provided  I agree with the note and the assessment and plan by Dr Ce Roberts DO      Briefly the patient is a 41 y o  male who presents to the office for evaluation and treatment  Please refer to the documented HPI in the body of the note for details  Physical Exam: Blood pressure 122/82, pulse 76, weight 134 kg (295 lb)      Left shoulder full range of motion good strength mildly positive Self Neer impingement signs      Assessment:    Left shoulder subacromial impingement    Plan:    Patient has improve his symptoms from his intra-articular injection and now has a separate problem of subacromial impingement, he does not feel he requires treatment at this time outside of referral to physical therapy so he can learn a home exercise program   If he wishes to have a subacromial injection I would be happy to repeat evaluation and consider that in the future, we also could consider further imaging but given the improvement of the symptoms I do not feel further imaging is required at this time and he agrees

## 2018-09-24 ENCOUNTER — EVALUATION (OUTPATIENT)
Dept: PHYSICAL THERAPY | Facility: CLINIC | Age: 38
End: 2018-09-24
Payer: COMMERCIAL

## 2018-09-24 DIAGNOSIS — M75.52 SUBACROMIAL BURSITIS OF LEFT SHOULDER JOINT: Primary | ICD-10-CM

## 2018-09-24 DIAGNOSIS — M25.512 ACUTE PAIN OF LEFT SHOULDER: ICD-10-CM

## 2018-09-24 PROCEDURE — G8985 CARRY GOAL STATUS: HCPCS | Performed by: PHYSICAL THERAPIST

## 2018-09-24 PROCEDURE — 97162 PT EVAL MOD COMPLEX 30 MIN: CPT | Performed by: PHYSICAL THERAPIST

## 2018-09-24 PROCEDURE — G8984 CARRY CURRENT STATUS: HCPCS | Performed by: PHYSICAL THERAPIST

## 2018-09-24 PROCEDURE — 97110 THERAPEUTIC EXERCISES: CPT | Performed by: PHYSICAL THERAPIST

## 2018-09-24 NOTE — PROGRESS NOTES
PT Evaluation     Today's date: 2018  Patient name: Adilson Wilde  : 1980  MRN: [id number]  Referring provider: Mateusz Tan MD  Dx:   Encounter Diagnosis     ICD-10-CM    1  Subacromial bursitis of left shoulder joint M75 52 Ambulatory referral to Physical Therapy     PT plan of care cert/re-cert   2  Acute pain of left shoulder M25 512 Ambulatory referral to Physical Therapy     PT plan of care cert/re-cert       Start Time: 61  Stop Time: 1540  Total time in clinic (min): 55 minutes    Assessment  Impairments: abnormal muscle firing, abnormal muscle tone, abnormal or restricted ROM, abnormal movement, impaired physical strength, lacks appropriate home exercise program, pain with function and poor posture     Assessment details: Patient is a 41 y o  male who presents with s/s consistent with L subcoracoid/subacromial bursitis and L subscapularis strain  Patient presents to evaluation with pain, decreased range of motion, decreased strength and decreased tolerance to activity  Patient was recently in PT for posterior L shoulder pain, which has resolved, and has an extensive HEP  Patient is a good candidate for skilled PT, and would benefit from skilled PT services to address these impairments, achieve goals, and to maximize function   Thank you for the referral       Understanding of Dx/Px/POC: good   Prognosis: good    Goals  Impairment Goals 4-6 weeks  - Decrease pain consistently to <2/10  - Improve shoulder AROM to equal to the unaffected upper extremity  - Increase shoulder strength to 5/5 throughout  - Increase scapular strength to 5/5 throughout    Functional Goals 6-8 weeks  - Return to Prior Level of Function  - Increase Functional Status Measure (FOTO) to: 80  - Patient will be independent with HEP  - Patient will be able to hold his son throughout the day without increased pain/compensation/difficulty  - Patient will be able to sleep on the L shoulder without increased pain/compensation/difficulty  - Patient will be able to return to swimming without increased pain/compensation/difficulty      Plan  Patient would benefit from: skilled physical therapy  Planned modality interventions: cryotherapy, electrical stimulation/Russian stimulation, high voltage pulsed current: pain management, high voltage pulsed current: spasm management, H-Wave, TENS, thermotherapy: hydrocollator packs, ultrasound and unattended electrical stimulation  Planned therapy interventions: home exercise program, strengthening, stretching, therapeutic activities, therapeutic exercise, flexibility, functional ROM exercises, body mechanics training, Bey taping, neuromuscular re-education, patient education, postural training, activity modification and balance  Frequency: 1x week  Duration in weeks: 4  Treatment plan discussed with: patient        Subjective Evaluation    History of Present Illness  Mechanism of injury: WORK: Patient is a , and otherwise unemployed  HOME LIFE: Lives at home with his wife and his 9 month old boy  HOBBIES/EXERCISE: Patient enjoys mountain biking, swimming, and elliptical for exercise  Patient also enjoys hunting and fishing  PLOF: Patient has had surgery to L shoulder in 2010 with a Bankart lesion repair  His L shoulder also occasionally flares up because he gets recurrent bursitis that usually goes away in a few days  HISTORY OF CURRENT INJURY: Patient has had bursitis pain for 5+ years, on and off, managed with NSAIDs  Patient recently went through physical therapy for posterior L shoulder pain, and this is no longer bothering him  His RTC does not bother him anymore  His bursitis pain in L anterior shoulder is minimal at the moment but can get severe    PAIN LOCATION/DESCRIPTORS: Pain in superior/anterior shoulder  AGGRAVATING FACTORS: Sleeping on the L shoulder, propped on elbows for extended periods, holding his son with L arm for prolonged periods  EASES: Stretching, icing, aleve  DAY PATTERN: No pattern  IMAGING: X-rays unremarkable  SPECIAL QUESTIONS: No hx of cancer, no complaints of numbness/tingling/burning, no incontinence, no issues sleeping  PATIENT GOALS: Patient's goal is to learn how to strengthen the shoulder and prevent bursitis in the future  Pain  Current pain ratin  At best pain ratin  At worst pain ratin  Quality: dull ache  Progression: improved    Patient Goals  Patient goals for therapy: decreased pain, return to sport/leisure activities and increased strength          Objective     Postural Observations  Seated posture: fair  Standing posture: fair  Correction of posture: makes symptoms better    Additional Postural Observation Details  Patient sits slightly forward head and rounded shoulders      Palpation     Additional Palpation Details  Tenderness to palpation over L bursa under coracoid process and anterior L shoulder    Active Range of Motion   Left Shoulder   Flexion: 143 degrees with pain  Abduction: 170 degrees   External rotation BTH: T4 with pain  Internal rotation BTB: T9     Right Shoulder   Flexion: 160 degrees   Abduction: 170 degrees   External rotation BTH: T4   Internal rotation BTB: T11     Passive Range of Motion   Left Shoulder   Flexion: 178 degrees   External rotation 90°: 85 degrees   Internal rotation 90°: 53 degrees     Right Shoulder   Flexion: 178 degrees   External rotation 90°: 85 degrees   Internal rotation 90°: 46 degrees     Strength/Myotome Testing     Left Shoulder     Planes of Motion   Flexion: 5   Abduction: 4+ (some discomfort)   External rotation at 0°: 5   Internal rotation at 0°: 5     Isolated Muscles   Lower trapezius: 4   Middle trapezius: 4+   Upper trapezius: 5     Right Shoulder     Planes of Motion   Flexion: 5   Abduction: 5   External rotation at 0°: 5   Internal rotation at 0°: 5     Isolated Muscles   Lower trapezius: 4   Middle trapezius: 4+   Upper trapezius: 5 Tests     Left Shoulder   Positive belly press, lift-off, passive horizontal adduction and Speed's  Negative active compression ([place]), apprehension, Hawkin's, horn blower, internal rotation lag sign, Yergason's and posterior apprehension   Right Shoulder   Negative active compression ([place]), Hawkin's, passive horizontal adduction, Speed's and Yergason's  Additional Tests Details  Pain with special tests occurred when lowering from testing position   Astericks sign with Speed's test    Diagnosis: Subcoracoid bursitis, Subsacpularis strain, L biceps tendinopathy   Precautions: Past Bankart lesion repair on L 2010   Manual Therapy 9/24       PROM         Capsular mobs        Scapular mobs        IASTM biceps tendon        STM biceps tendon        Exercise Diary  9/24       Cane shoulder extension stretch 3x30 sec       UBE with scap retraction        Scapular retraction*        UT/levator stretch        Pec stretch one arm*        Thoracic 3 way stretch in chair        Serratus press        Alt flx/abd/ER        Gentle sleeper stretch 3x30 sec       TB rows/ext        TB abd/flx        TB IR/ER        No money's        Posterior shoulder stretch*        IR behind back stretch        Prone scapular program        Prone Ws        Wall washes        Scap stab red TB        Ball bounces on wall        Body blade        Shelf lifts                Re-evaluation, discharge, HEP        Modalities        Ice and IFC                          Flowsheet Rows      Most Recent Value   PT/OT G-Codes   Current Score  84   Projected Score  85   FOTO information reviewed  Yes   Assessment Type  Evaluation   G code set  Carrying, Moving & Handling Objects   Carrying, Moving and Handling Objects Current Status ()  CI   Carrying, Moving and Handling Objects Goal Status ()  CH

## 2018-10-02 ENCOUNTER — OFFICE VISIT (OUTPATIENT)
Dept: PHYSICAL THERAPY | Facility: CLINIC | Age: 38
End: 2018-10-02
Payer: COMMERCIAL

## 2018-10-02 DIAGNOSIS — M75.52 SUBACROMIAL BURSITIS OF LEFT SHOULDER JOINT: Primary | ICD-10-CM

## 2018-10-02 DIAGNOSIS — M25.512 ACUTE PAIN OF LEFT SHOULDER: ICD-10-CM

## 2018-10-02 PROCEDURE — 97112 NEUROMUSCULAR REEDUCATION: CPT | Performed by: PHYSICAL THERAPIST

## 2018-10-02 PROCEDURE — 97110 THERAPEUTIC EXERCISES: CPT | Performed by: PHYSICAL THERAPIST

## 2018-10-02 PROCEDURE — 97140 MANUAL THERAPY 1/> REGIONS: CPT | Performed by: PHYSICAL THERAPIST

## 2018-10-02 NOTE — PROGRESS NOTES
Daily Note     Today's date: 10/2/2018  Patient name: Brielle Pacheco  : 1980  MRN: [id number]  Referring provider: Jadyn Rajan MD  Dx:   Encounter Diagnosis     ICD-10-CM    1  Subacromial bursitis of left shoulder joint M75 52    2  Acute pain of left shoulder M25 512        Start Time: 1130  Stop Time: 1225  Total time in clinic (min): 55 minutes    Subjective: Patient has only minor pain, and he is only sore because he stretched this morning  Otherwise, patient has had minimal pain as he has assistance with his son  No focal discomfort today  He has been benching as well  Objective: See treatment diary below    Diagnosis: Subcoracoid bursitis, Subsacpularis strain, L biceps tendinopathy   Precautions: Past Bankart lesion repair on L 2010   Manual Therapy  10      PROM   5      Capsular mobs        Scapular mobs        IASTM biceps tendon        STM biceps tendon  5      Exercise Diary  9/24 10/2      Cane shoulder extension stretch 3x30 sec 3x30 sec      UBE with scap retraction  3/3 L 2 6      Scapular retraction*  HEP      UT/levator stretch  HEP      Pec stretch one arm*  HEP      Thoracic 3 way stretch in chair        Serratus press        Alt flx/abd/ER        Gentle sleeper stretch 3x30 sec HEP      TB rows/ext  [place] 50# rows 3x10, 25# for ext 3x10      TB abd/flx        TB IR/ER  [place] 3x10 10# ER, 13# IR      TB ER at 90  Red TB 3x10      No money's        Posterior shoulder stretch*  HEP      IR behind back stretch  3x30 sec      Prone scapular program  4# x 10 ITY      Prone Ws  X 25       Wall washes        Scap stab red TB        Ball bounces on wall  Red ball 3x30      Body blade        Shelf lifts                Re-evaluation, discharge, HEP        Modalities        Ice and IFC                            Assessment: Tolerated treatment well  Patient exhibited good technique with therapeutic exercises and had no complaints of pain throughout session    Patient's main goal is to learn stretches and exercises to prevent bursitis in the future  Plan: Continue per plan of care  Plan to see patient next week, then take a week break to discharge end of the month with updated HEP

## 2018-10-08 ENCOUNTER — APPOINTMENT (EMERGENCY)
Dept: RADIOLOGY | Facility: HOSPITAL | Age: 38
End: 2018-10-08
Payer: COMMERCIAL

## 2018-10-08 ENCOUNTER — HOSPITAL ENCOUNTER (EMERGENCY)
Facility: HOSPITAL | Age: 38
Discharge: HOME | End: 2018-10-08
Attending: EMERGENCY MEDICINE | Admitting: EMERGENCY MEDICINE
Payer: COMMERCIAL

## 2018-10-08 ENCOUNTER — APPOINTMENT (EMERGENCY)
Dept: CT IMAGING | Facility: HOSPITAL | Age: 38
End: 2018-10-08
Payer: COMMERCIAL

## 2018-10-08 VITALS
TEMPERATURE: 98.3 F | WEIGHT: 309.75 LBS | HEART RATE: 70 BPM | OXYGEN SATURATION: 97 % | SYSTOLIC BLOOD PRESSURE: 138 MMHG | DIASTOLIC BLOOD PRESSURE: 79 MMHG | RESPIRATION RATE: 18 BRPM | BODY MASS INDEX: 49.25 KG/M2

## 2018-10-08 DIAGNOSIS — Q63.1 HORSESHOE KIDNEY: ICD-10-CM

## 2018-10-08 DIAGNOSIS — I88.0 MESENTERIC ADENITIS: Primary | ICD-10-CM

## 2018-10-08 DIAGNOSIS — K76.0 FATTY LIVER: ICD-10-CM

## 2018-10-08 LAB
ALBUMIN SERPL BCP-MCNC: 3.9 G/DL (ref 3.5–5)
ALP SERPL-CCNC: 64 U/L (ref 46–116)
ALT SERPL W P-5'-P-CCNC: 100 U/L (ref 12–78)
ANION GAP SERPL CALCULATED.3IONS-SCNC: 9 MMOL/L (ref 4–13)
APTT PPP: 25 SECONDS (ref 24–36)
AST SERPL W P-5'-P-CCNC: 33 U/L (ref 5–45)
BASOPHILS # BLD AUTO: 0.04 THOUSANDS/ΜL (ref 0–0.1)
BASOPHILS NFR BLD AUTO: 1 % (ref 0–1)
BILIRUB DIRECT SERPL-MCNC: 0.13 MG/DL (ref 0–0.2)
BILIRUB SERPL-MCNC: 0.6 MG/DL (ref 0.2–1)
BUN SERPL-MCNC: 13 MG/DL (ref 5–25)
CALCIUM SERPL-MCNC: 9.2 MG/DL (ref 8.3–10.1)
CHLORIDE SERPL-SCNC: 105 MMOL/L (ref 100–108)
CO2 SERPL-SCNC: 27 MMOL/L (ref 21–32)
CREAT SERPL-MCNC: 1.03 MG/DL (ref 0.6–1.3)
EOSINOPHIL # BLD AUTO: 0.1 THOUSAND/ΜL (ref 0–0.61)
EOSINOPHIL NFR BLD AUTO: 3 % (ref 0–6)
ERYTHROCYTE [DISTWIDTH] IN BLOOD BY AUTOMATED COUNT: 11.8 % (ref 11.6–15.1)
GFR SERPL CREATININE-BSD FRML MDRD: 92 ML/MIN/1.73SQ M
GLUCOSE SERPL-MCNC: 109 MG/DL (ref 65–140)
HCT VFR BLD AUTO: 43.8 % (ref 36.5–49.3)
HGB BLD-MCNC: 15 G/DL (ref 12–17)
IMM GRANULOCYTES # BLD AUTO: 0.01 THOUSAND/UL (ref 0–0.2)
IMM GRANULOCYTES NFR BLD AUTO: 0 % (ref 0–2)
INR PPP: 1 (ref 0.86–1.17)
LIPASE SERPL-CCNC: 206 U/L (ref 73–393)
LYMPHOCYTES # BLD AUTO: 1.11 THOUSANDS/ΜL (ref 0.6–4.47)
LYMPHOCYTES NFR BLD AUTO: 29 % (ref 14–44)
MCH RBC QN AUTO: 31.3 PG (ref 26.8–34.3)
MCHC RBC AUTO-ENTMCNC: 34.2 G/DL (ref 31.4–37.4)
MCV RBC AUTO: 91 FL (ref 82–98)
MONOCYTES # BLD AUTO: 0.45 THOUSAND/ΜL (ref 0.17–1.22)
MONOCYTES NFR BLD AUTO: 12 % (ref 4–12)
NEUTROPHILS # BLD AUTO: 2.09 THOUSANDS/ΜL (ref 1.85–7.62)
NEUTS SEG NFR BLD AUTO: 55 % (ref 43–75)
NRBC BLD AUTO-RTO: 0 /100 WBCS
PLATELET # BLD AUTO: 211 THOUSANDS/UL (ref 149–390)
PMV BLD AUTO: 10.2 FL (ref 8.9–12.7)
POTASSIUM SERPL-SCNC: 3.7 MMOL/L (ref 3.5–5.3)
PROT SERPL-MCNC: 7.7 G/DL (ref 6.4–8.2)
PROTHROMBIN TIME: 12.9 SECONDS (ref 11.8–14.2)
RBC # BLD AUTO: 4.8 MILLION/UL (ref 3.88–5.62)
SODIUM SERPL-SCNC: 141 MMOL/L (ref 136–145)
WBC # BLD AUTO: 3.8 THOUSAND/UL (ref 4.31–10.16)

## 2018-10-08 PROCEDURE — 85730 THROMBOPLASTIN TIME PARTIAL: CPT | Performed by: EMERGENCY MEDICINE

## 2018-10-08 PROCEDURE — 74177 CT ABD & PELVIS W/CONTRAST: CPT

## 2018-10-08 PROCEDURE — 93005 ELECTROCARDIOGRAM TRACING: CPT

## 2018-10-08 PROCEDURE — 80076 HEPATIC FUNCTION PANEL: CPT | Performed by: EMERGENCY MEDICINE

## 2018-10-08 PROCEDURE — 83690 ASSAY OF LIPASE: CPT | Performed by: EMERGENCY MEDICINE

## 2018-10-08 PROCEDURE — 71046 X-RAY EXAM CHEST 2 VIEWS: CPT

## 2018-10-08 PROCEDURE — 99285 EMERGENCY DEPT VISIT HI MDM: CPT

## 2018-10-08 PROCEDURE — 85610 PROTHROMBIN TIME: CPT | Performed by: EMERGENCY MEDICINE

## 2018-10-08 PROCEDURE — 36415 COLL VENOUS BLD VENIPUNCTURE: CPT | Performed by: EMERGENCY MEDICINE

## 2018-10-08 PROCEDURE — 85025 COMPLETE CBC W/AUTO DIFF WBC: CPT | Performed by: EMERGENCY MEDICINE

## 2018-10-08 PROCEDURE — 80048 BASIC METABOLIC PNL TOTAL CA: CPT | Performed by: EMERGENCY MEDICINE

## 2018-10-08 RX ADMIN — IOHEXOL 100 ML: 350 INJECTION, SOLUTION INTRAVENOUS at 05:53

## 2018-10-08 NOTE — ED PROVIDER NOTES
History  Chief Complaint   Patient presents with    Abdominal Pain     Pt presents to ED for evaluation and treatment of abdominal pain and chest pain which radiates to the back  Symptoms begab at 0300  Vomited x 1    Chest Pain       History provided by:  Patient and spouse  Abdominal Pain   Pain location:  Epigastric and RUQ  Pain quality: aching and sharp    Pain radiation: R Scapular  Pain severity:  Moderate  Onset quality:  Sudden  Duration:  3 hours  Timing:  Constant  Progression:  Worsening  Chronicity:  New  Context: awakening from sleep    Context: not recent illness    Context comment:  Over past 2 days eatting richer/fatter foods then normal  Relieved by:  None tried  Worsened by:  Nothing  Ineffective treatments:  None tried  Associated symptoms: chest pain and vomiting    Associated symptoms: no chills, no constipation, no cough, no diarrhea, no dysuria, no fever, no nausea, no shortness of breath and no sore throat    Vomiting:     Quality:  Stomach contents    Number of occurrences: Once    Severity:  Moderate    Duration:  1 hour    Timing:  Constant    Progression:  Unchanged  Chest Pain   Associated symptoms: abdominal pain and vomiting    Associated symptoms: no cough, no diaphoresis, no dizziness, no fever, no headache, no nausea, no numbness, no palpitations and no shortness of breath        Prior to Admission Medications   Prescriptions Last Dose Informant Patient Reported? Taking?    Multiple Vitamins-Minerals (DAILY MULTIVITAMIN PO) More than a month at Unknown time  Yes No   Sig: Take 1 tablet by mouth daily   Omega-3 Fatty Acids (FISH OIL) 645 MG CAPS 10/7/2018 at Unknown time  Yes Yes   Sig: Take by mouth   VITAMIN D, CHOLECALCIFEROL, PO 10/7/2018 at Unknown time  Yes Yes   Sig: Take by mouth   famotidine (PEPCID) 20 mg tablet 10/7/2018 at Unknown time  No Yes   Sig: Take 1 tablet (20 mg total) by mouth 2 (two) times a day   glucosamine-chondroitin 500-400 MG tablet 10/7/2018 at Unknown time  Yes Yes   Sig: Take 1 tablet by mouth 3 (three) times a day      Facility-Administered Medications: None       Past Medical History:   Diagnosis Date    Acid reflux     History of viral meningitis     Varicose veins of both lower extremities        Past Surgical History:   Procedure Laterality Date    KNEE ARTHROSCOPY W/ LATERAL RELEASE Right     NM ENDOVENOUS LASER, 1ST VEIN Right 6/13/2017    Procedure: GSV EVLT W/ STAB PHLEBECTOMIES ;  Surgeon: Bárbara Olgiun MD;  Location: [facility];  Service: Vascular    NM ESOPHAGOGASTRODUODENOSCOPY TRANSORAL DIAGNOSTIC N/A 7/19/2016    Procedure: ESOPHAGOGASTRODUODENOSCOPY (EGD); Surgeon: Maryann Pinto MD;  Location: [facility]; Service: Gastroenterology    SHOULDER ARTHROSCOPY W/ LABRAL REPAIR Left        History reviewed  No pertinent family history  I have reviewed and agree with the history as documented  Social History   Substance Use Topics    Smoking status: Never Smoker    Smokeless tobacco: Never Used    Alcohol use Yes      Comment: social, weekends         Review of Systems   Constitutional: Negative for activity change, chills, diaphoresis and fever  HENT: Negative for congestion, sinus pressure and sore throat  Eyes: Negative for pain and visual disturbance  Respiratory: Negative for cough, chest tightness, shortness of breath, wheezing and stridor  Cardiovascular: Positive for chest pain  Negative for palpitations  Gastrointestinal: Positive for abdominal pain and vomiting  Negative for abdominal distention, constipation, diarrhea and nausea  Genitourinary: Negative for dysuria and frequency  Musculoskeletal: Negative for neck pain and neck stiffness  Skin: Negative for rash  Neurological: Negative for dizziness, speech difficulty, light-headedness, numbness and headaches  Physical Exam  Physical Exam   Constitutional: He is oriented to person, place, and time  He appears well-developed  No distress  HENT:   Head: Normocephalic and atraumatic  Eyes: Pupils are equal, round, and reactive to light  Neck: Normal range of motion  Neck supple  No tracheal deviation present  Cardiovascular: Normal rate, regular rhythm, normal heart sounds and intact distal pulses  No murmur heard  Pulmonary/Chest: Effort normal and breath sounds normal  No stridor  No respiratory distress  Abdominal: Soft  He exhibits no distension  There is tenderness ( RUQ)  There is no rebound and no guarding  Musculoskeletal: Normal range of motion  Neurological: He is alert and oriented to person, place, and time  Skin: Skin is warm and dry  He is not diaphoretic  No erythema  No pallor  Psychiatric: He has a normal mood and affect  Vitals reviewed        Vital Signs  ED Triage Vitals   Temperature Pulse Respirations Blood Pressure SpO2   10/08/18 0500 10/08/18 0446 10/08/18 0446 10/08/18 0446 10/08/18 0446   98 3 °F (36 8 °C) 78 18 144/90 97 %      Temp Source Heart Rate Source Patient Position - Orthostatic VS BP Location FiO2 (%)   10/08/18 0500 10/08/18 0446 10/08/18 0446 10/08/18 0446 --   Oral Monitor Lying Right arm       Pain Score       10/08/18 0446       5           Vitals:    10/08/18 0446 10/08/18 0602   BP: 144/90 133/61   Pulse: 78 79   Patient Position - Orthostatic VS: Lying Lying       Visual Acuity      ED Medications  Medications   iohexol (OMNIPAQUE) 350 MG/ML injection (MULTI-DOSE) 100 mL (100 mL Intravenous Given 10/8/18 0553)       Diagnostic Studies  Results Reviewed     Procedure Component Value Units Date/Time    Basic metabolic panel [[id number]] Collected:  10/08/18 0505    Lab Status:  Final result Specimen:  Blood from Arm, Right Updated:  10/08/18 0531     Sodium 141 mmol/L      Potassium 3 7 mmol/L      Chloride 105 mmol/L      CO2 27 mmol/L      ANION GAP 9 mmol/L      BUN 13 mg/dL      Creatinine 1 03 mg/dL      Glucose 109 mg/dL      Calcium 9 2 mg/dL      eGFR 92 ml/min/1 73sq m Narrative:         National Kidney Disease Education Program recommendations are as follows:  GFR calculation is accurate only with a steady state creatinine  Chronic Kidney disease less than 60 ml/min/1 73 sq  meters  Kidney failure less than 15 ml/min/1 73 sq  meters      Hepatic function panel [[id number]]  (Abnormal) Collected:  10/08/18 0505    Lab Status:  Final result Specimen:  Blood from Arm, Right Updated:  10/08/18 0531     Total Bilirubin 0 60 mg/dL      Bilirubin, Direct 0 13 mg/dL      Alkaline Phosphatase 64 U/L      AST 33 U/L       (H) U/L      Total Protein 7 7 g/dL      Albumin 3 9 g/dL     Lipase [[id number]]  (Normal) Collected:  10/08/18 0505    Lab Status:  Final result Specimen:  Blood from Arm, Right Updated:  10/08/18 0531     Lipase 206 u/L     Protime-INR [[id number]]  (Normal) Collected:  10/08/18 0505    Lab Status:  Final result Specimen:  Blood from Arm, Right Updated:  10/08/18 0525     Protime 12 9 seconds      INR 1 00    APTT [[id number]]  (Normal) Collected:  10/08/18 0505    Lab Status:  Final result Specimen:  Blood from Arm, Right Updated:  10/08/18 0525     PTT 25 seconds     CBC and differential [[id number]]  (Abnormal) Collected:  10/08/18 0505    Lab Status:  Final result Specimen:  Blood from Arm, Right Updated:  10/08/18 0512     WBC 3 80 (L) Thousand/uL      RBC 4 80 Million/uL      Hemoglobin 15 0 g/dL      Hematocrit 43 8 %      MCV 91 fL      MCH 31 3 pg      MCHC 34 2 g/dL      RDW 11 8 %      MPV 10 2 fL      Platelets 142 Thousands/uL      nRBC 0 /100 WBCs      Neutrophils Relative 55 %      Immat GRANS % 0 %      Lymphocytes Relative 29 %      Monocytes Relative 12 %      Eosinophils Relative 3 %      Basophils Relative 1 %      Neutrophils Absolute 2 09 Thousands/µL      Immature Grans Absolute 0 01 Thousand/uL      Lymphocytes Absolute 1 11 Thousands/µL      Monocytes Absolute 0 45 Thousand/µL      Eosinophils Absolute 0 10 Thousand/µL      Basophils Absolute 0 04 Thousands/µL                  CT abdomen pelvis with contrast   Final Result by Marcela Olszewski, DO (10/08 1629)      Fatty infiltration of the liver is questioned  In the setting of abdominal pain and/or elevated liver function tests, consider steatohepatitis  Mild ill-defined stranding in the mid abdomen with some associated shotty mesenteric lymph nodes seen (for example axial image 39 and 40) suspicious for mesenteric inflammation/panniculitis; of unknown clinical significance  Horseshoe kidney                    Workstation performed: [id number]         XR chest 2 views   ED Interpretation by Amanda Nunn DO (10/08 7327)   No acute pathology                 Procedures  ECG 12 Lead Documentation  Date/Time: 10/8/2018 5:17 AM  Performed by: Natalie Mims by: Sabi Marin     ECG reviewed by me, the ED Provider: yes    Patient location:  ED  Previous ECG:     Previous ECG:  Compared to current    Comparison ECG info:  4 10 2017  Interpretation:     Interpretation: normal    Rate:     ECG rate:  70    ECG rate assessment: normal    Rhythm:     Rhythm: sinus rhythm    Ectopy:     Ectopy: none    QRS:     QRS axis:  Normal    QRS intervals:  Normal  Conduction:     Conduction: normal    ST segments:     ST segments:  Normal  T waves:     T waves: normal             Phone Contacts  ED Phone Contact    ED Course                               MDM  Number of Diagnoses or Management Options  Fatty liver: minor  Horseshoe kidney: minor  Mesenteric adenitis: new and requires workup  Diagnosis management comments:       Initial ED assessment:  Awoke due to RUQ abd pain, now tender in RUQ    Initial DDx includes but is not limited to:   Cholelithiasis, cholecystitis, choledocholithiasis, pancreatitis, gastritis, peptic ulcer disease, less likely colitis or diverticulitis    Initial ED plan:   Blood work, CT imaging, disposition pending ED workup                Amount and/or Complexity of Data Reviewed  Clinical lab tests: ordered and reviewed  Tests in the radiology section of CPT®: ordered and reviewed  Decide to obtain previous medical records or to obtain history from someone other than the patient: yes  Obtain history from someone other than the patient: yes  Review and summarize past medical records: yes  Independent visualization of images, tracings, or specimens: yes      CritCare Time    Disposition  Final diagnoses:   Mesenteric adenitis   Fatty liver   Horseshoe kidney     Time reflects when diagnosis was documented in both MDM as applicable and the Disposition within this note     Time User Action Codes Description Comment    10/8/2018  6:37 AM Mayela Media Add [I88 0] Mesenteric adenitis     10/8/2018  6:37 AM Jemma VIDALES Add [K76 0] Fatty liver     10/8/2018  6:37 AM Mayela Media Add [Q63 1] Horseshoe kidney       ED Disposition     ED Disposition Condition Comment    Discharge  [place] discharge to home/self care  Condition at discharge: Good        Follow-up Information     Follow up With Specialties Details Why Contact Info    Tommy Moore DO Family Medicine Go to If symptoms worsen [address] [facility] [address]  [phone]            Patient's Medications   Discharge Prescriptions    No medications on file     No discharge procedures on file      ED Provider  Electronically Signed by           Nestor Marcelo DO  10/08/18 4464

## 2018-10-08 NOTE — DISCHARGE INSTRUCTIONS
Mesenteric Adenitis   WHAT YOU NEED TO KNOW:   Mesenteric adenitis is inflammation of lymph nodes in the tissue that surrounds your intestines  Lymph nodes are organs of the immune system that help absorb bacteria and toxins from your body  It is caused by infection from bacteria, viruses, or parasites  Mesenteric adenitis may cause dehydration and loss of electrolytes (minerals), such as sodium  Rarely, it could lead to sepsis (a serious blood infection) or an abscess (pus-filled wound) on your intestine  DISCHARGE INSTRUCTIONS:   Follow up with your healthcare provider as directed:  Write down your questions so you remember to ask them during your visits  Prevent mesenteric adenitis:   · Wash your hands  Use soap and water  Wash your hands after you use the bathroom, change a child's diapers, or sneeze  Wash your hands before you prepare or eat food  · Cook meats all the way through  Use a meat thermometer to make sure meat is heated to a temperature that will kill bacteria  Do not eat raw or undercooked chicken, turkey, seafood, beef, or pork  · Drink safe water  Drink only treated water  Do not drink water from ponds or lakes  · Drink safe milk  Drink only pasteurized milk  Do not drink raw milk  Contact your healthcare provider if:   · Your symptoms return  · You have questions or concerns about your condition or care  Return to the emergency department if:   · You pass very little urine  · You cannot pass a bowel movement or gas  · You are extremely thirsty  · You become pale, exhausted, or sweaty without effort  · You have swelling in your abdomen  © 2017 [employer]  Information is for End User's use only and may not be sold, redistributed or otherwise used for commercial purposes  All illustrations and images included in CareNotes® are the copyrighted property of [employer]  or [employer]  The above information is an  only  It is not intended as medical advice for individual conditions or treatments  Talk to your doctor, nurse or pharmacist before following any medical regimen to see if it is safe and effective for you  Non-Alcoholic Fatty Liver Disease   WHAT YOU NEED TO KNOW:   Non-alcoholic fatty liver disease (NAFLD) is a buildup of fat in your liver from a condition other than alcoholism  DISCHARGE INSTRUCTIONS:   Medicines:   · Medicines  may be given to manage blood sugar or cholesterol levels  · Take your medicine as directed  Contact your healthcare provider if you think your medicine is not helping or if you have side effects  Tell him or her if you are allergic to any medicine  Keep a list of the medicines, vitamins, and herbs you take  Include the amounts, and when and why you take them  Bring the list or the pill bottles to follow-up visits  Carry your medicine list with you in case of an emergency  Follow up with your healthcare provider as directed: You may need to return for more tests  You may also be referred to a specialist  Write down your questions so you remember to ask them during your visits  Manage NAFLD:   · Maintain a healthy weight  Ask your healthcare provider how much you should weigh  Ask him to help you create a weight loss plan if you are overweight  · Exercise  Aerobic exercise 3 times a week for 20 to 45 minutes can help decrease fat buildup in your liver  Examples are cycling, brisk walking, and jogging  Ask your healthcare provider about the best exercise plan for you  · Eat healthy foods  Examples are vegetables, fruit, whole-grain breads, low-fat dairy products, beans, lean meats, and fish  Foods low in simple carbohydrates, high fructose corn syrup, and trans fat may help decrease fat buildup in your liver  · Do not drink alcohol  Alcohol may make NAFLD worse and harm your liver    Contact your healthcare provider if:   · You have increased pain or swelling in your abdomen  · You feel more tired than usual     · You bruise or bleed easily  · Your skin or the whites of your eyes look yellow  · You have questions or concerns about your condition or care  Return to the emergency department if:   · You have shortness of breath  · You have trouble thinking clearly or are confused  · You feel lightheaded or faint  · You have shaking, chills, and a fever  © 2017 [facility] [facility] Information is for End User's use only and may not be sold, redistributed or otherwise used for commercial purposes  All illustrations and images included in CareNotes® are the copyrighted property of [employer]  or [employer]  The above information is an  only  It is not intended as medical advice for individual conditions or treatments  Talk to your doctor, nurse or pharmacist before following any medical regimen to see if it is safe and effective for you

## 2018-10-09 ENCOUNTER — OFFICE VISIT (OUTPATIENT)
Dept: PHYSICAL THERAPY | Facility: CLINIC | Age: 38
End: 2018-10-09
Payer: COMMERCIAL

## 2018-10-09 DIAGNOSIS — M25.512 ACUTE PAIN OF LEFT SHOULDER: ICD-10-CM

## 2018-10-09 DIAGNOSIS — M75.52 SUBACROMIAL BURSITIS OF LEFT SHOULDER JOINT: Primary | ICD-10-CM

## 2018-10-09 LAB
ATRIAL RATE: 70 BPM
P AXIS: 38 DEGREES
PR INTERVAL: 152 MS
QRS AXIS: 1 DEGREES
QRSD INTERVAL: 90 MS
QT INTERVAL: 376 MS
QTC INTERVAL: 406 MS
T WAVE AXIS: 32 DEGREES
VENTRICULAR RATE: 70 BPM

## 2018-10-09 PROCEDURE — 97110 THERAPEUTIC EXERCISES: CPT | Performed by: PHYSICAL THERAPIST

## 2018-10-09 PROCEDURE — 93010 ELECTROCARDIOGRAM REPORT: CPT | Performed by: INTERNAL MEDICINE

## 2018-10-09 PROCEDURE — 97112 NEUROMUSCULAR REEDUCATION: CPT | Performed by: PHYSICAL THERAPIST

## 2018-10-09 NOTE — PROGRESS NOTES
Daily Note     Today's date: 10/9/2018  Patient name: Dominic Gaitan  : 1980  MRN: [id number]  Referring provider: Tarik Poe MD  Dx:   Encounter Diagnosis     ICD-10-CM    1  Subacromial bursitis of left shoulder joint M75 52    2  Acute pain of left shoulder M25 512        Start Time: 1130  Stop Time: 1215  Total time in clinic (min): 45 minutes    Subjective: Patient reports he is "fired up" about something that happened at work and his shoulder has been pretty good  Objective: See treatment diary below    Diagnosis: Subcoracoid bursitis, Subsacpularis strain, L biceps tendinopathy   Precautions: Past Bankart lesion repair on L    Manual Therapy 9/24 10/2 10/9     PROM   5      Capsular mobs        Scapular mobs        IASTM biceps tendon        STM biceps tendon  5      Exercise Diary  9/24 10/2 10/9     Cane shoulder extension stretch 3x30 sec 3x30 sec 3x30 sec ea     UBE with scap retraction  3/3 L 2 6 1 min ea     Scapular retraction*  HEP      UT/levator stretch  HEP      Pec stretch one arm*  HEP      Thoracic 3 way stretch in chair        Serratus press        Alt flx/abd/ER        Gentle sleeper stretch 3x30 sec HEP      TB rows/ext  [place] 50# rows 3x10, 25# for ext 3x10      TB abd/flx        TB IR/ER  [place] 3x10 10# ER, 13# IR      TB ER at 90  Red TB 3x10 Green TB 3x10      No money's   X 30 blue TB     Posterior shoulder stretch*  HEP      IR behind back stretch  3x30 sec      Prone scapular program  4# x 10 ITY 4# ITY 3x10     Prone Ws  X 25  X 25     Wall washes   3x30 sec ea way     Scap stab red TB   2x10 ea     Full plank   PT demonstration     Side plank   PT demonstration     Ball bounces on wall  Red ball 3x30 Red ball 3x30     Body blade        Shelf lifts                Re-evaluation, discharge, HEP        Modalities        Ice and IFC                          Assessment: Tolerated treatment well   Patient exhibited good technique with therapeutic exercises and was provided with advanced HEP to perform for the next 2 weeks prior to re-evaluation and discharge next visit  No complaints of pain this session  He was in the ER yesterday for abdominal pain due to inflamed lymph nodes, and was unable to perform planks due to the residual pain  Plan: Potential discharge next visit

## 2018-10-18 ENCOUNTER — APPOINTMENT (OUTPATIENT)
Dept: PHYSICAL THERAPY | Facility: CLINIC | Age: 38
End: 2018-10-18
Payer: COMMERCIAL

## 2018-10-23 ENCOUNTER — EVALUATION (OUTPATIENT)
Dept: PHYSICAL THERAPY | Facility: CLINIC | Age: 38
End: 2018-10-23
Payer: COMMERCIAL

## 2018-10-23 DIAGNOSIS — M75.52 SUBACROMIAL BURSITIS OF LEFT SHOULDER JOINT: ICD-10-CM

## 2018-10-23 DIAGNOSIS — M25.512 ACUTE PAIN OF LEFT SHOULDER: Primary | ICD-10-CM

## 2018-10-23 PROCEDURE — 97112 NEUROMUSCULAR REEDUCATION: CPT | Performed by: PHYSICAL THERAPIST

## 2018-10-23 PROCEDURE — G8985 CARRY GOAL STATUS: HCPCS | Performed by: PHYSICAL THERAPIST

## 2018-10-23 PROCEDURE — G8986 CARRY D/C STATUS: HCPCS | Performed by: PHYSICAL THERAPIST

## 2018-10-23 PROCEDURE — G8984 CARRY CURRENT STATUS: HCPCS | Performed by: PHYSICAL THERAPIST

## 2018-10-23 NOTE — PROGRESS NOTES
PT Discharge    Today's date: 10/23/2018  Patient name: Janeth Aggarwal  : 1980  MRN: [id number]  Referring provider: Bird Schmidt MD  Dx:   Encounter Diagnosis     ICD-10-CM    1  Acute pain of left shoulder M25 512    2  Subacromial bursitis of left shoulder joint M75 52        Start Time: 1130  Stop Time: 1200  Total time in clinic (min): 30 minutes    Assessment  Impairments: lacks appropriate home exercise program    Assessment details: Janeth Aggarwal has been compliant with attending physical therapy and completing home exercise program since initial evaluation  Evonne Galvez  has made improvements in objective data since initial evalulation and has achieved all goals  Patient reports having returned to their prior level of function  Patient provided with updated Home Exercise Program, all questions answered, and verbalized understanding, agreeing to plan of care   Thus it was mutually decided to discontinue this episode of care and transition to Home Exercise Program        Understanding of Dx/Px/POC: good   Prognosis: good    Goals  Impairment Goals 4-6 weeks  - Decrease pain consistently to <2/10 - met  - Improve shoulder AROM to equal to the unaffected upper extremity - met  - Increase shoulder strength to 5/5 throughout - met  - Increase scapular strength to 5/5 throughout - met    Functional Goals 6-8 weeks  - Return to Prior Level of Function - met  - Increase Functional Status Measure (FOTO) to: 80 - met  - Patient will be independent with HEP - met  - Patient will be able to hold his son throughout the day without increased pain/compensation/difficulty - met  - Patient will be able to sleep on the L shoulder without increased pain/compensation/difficulty - met  - Patient will be able to return to swimming without increased pain/compensation/difficulty - met      Plan  Planned therapy interventions: home exercise program  Treatment plan discussed with: patient        Subjective Evaluation    History of Present Illness  Mechanism of injury: WORK: Patient is a , and otherwise unemployed  HOME LIFE: Lives at home with his wife and his 9 month old boy  HOBBIES/EXERCISE: Patient enjoys mountain biking, swimming, and elliptical for exercise  Patient also enjoys hunting and fishing  PLOF: Patient has had surgery to L shoulder in  with a Bankart lesion repair  His L shoulder also occasionally flares up because he gets recurrent bursitis that usually goes away in a few days  Re-evaluation:  HISTORY OF CURRENT INJURY: Patient reports he has not had pain in 1 5 weeks at all in the shoulder or the bursitis area  PAIN LOCATION/DESCRIPTORS: Pain in superior/anterior shoulder  AGGRAVATING FACTORS: None remain  Improved: Sleeping on the L shoulder, propped on elbows for extended periods, holding his son with L arm for prolonged periods  EASES: Stretching and HEP  DAY PATTERN: No pattern  IMAGING: X-rays unremarkable  SPECIAL QUESTIONS: No hx of cancer, no complaints of numbness/tingling/burning, no incontinence, no issues sleeping  PATIENT GOALS: Patient's goal is to learn how to strengthen the shoulder and prevent bursitis in the future  - Patient rates himself 100% improved and achieved his goals  Pain  Current pain ratin  At best pain ratin  At worst pain ratin  Progression: resolved    Patient Goals  Patient goals for therapy: return to sport/leisure activities          Objective     Postural Observations  Seated posture: fair  Standing posture: good    Additional Postural Observation Details  Patient sits slightly forward head and rounded shoulders  Palpation     Additional Palpation Details  No remaining TTP of L shoulder      Active Range of Motion   Left Shoulder   Flexion: 158 degrees   Abduction: 170 degrees   External rotation BTH: T4   Internal rotation BTB: T9     Right Shoulder   Flexion: 160 degrees   Abduction: 170 degrees   External rotation BTH: T4   Internal rotation BTB: T11     Passive Range of Motion   Left Shoulder   Flexion: 178 degrees   External rotation 90°: 90 degrees   Internal rotation 90°: 65 degrees     Right Shoulder   Flexion: 178 degrees   External rotation 90°: 90 degrees   Internal rotation 90°: 51 degrees     Strength/Myotome Testing     Left Shoulder     Planes of Motion   Flexion: 5   Abduction: 5   External rotation at 0°: 5   Internal rotation at 0°: 5     Isolated Muscles   Lower trapezius: 4+   Middle trapezius: 5   Upper trapezius: 5     Right Shoulder     Planes of Motion   Flexion: 5   Abduction: 5   External rotation at 0°: 5   Internal rotation at 0°: 5     Isolated Muscles   Lower trapezius: 4+   Middle trapezius: 5   Upper trapezius: 5     Tests     Left Shoulder   Negative active compression (Hamilton), apprehension, belly press, Hawkin's, horn blower, internal rotation lag sign, lift-off, passive horizontal adduction, Speed's, Yergason's and posterior apprehension   Right Shoulder   Negative active compression (Hamilton), Hawkin's, passive horizontal adduction, Speed's and Yergason's       Additional Tests Details  No remaining positive special tests    Diagnosis: Subcoracoid bursitis, Subsacpularis strain, L biceps tendinopathy   Precautions: Past Bankart lesion repair on L 2010   Manual Therapy 9/24 10/2 10/9 10/23    PROM   5      Capsular mobs        Scapular mobs        IASTM biceps tendon        STM biceps tendon  5      Exercise Diary  9/24 10/2 10/9     Cane shoulder extension stretch 3x30 sec 3x30 sec 3x30 sec ea     UBE with scap retraction  3/3 L 2 6 1 min ea     Scapular retraction*  HEP      UT/levator stretch  HEP      Pec stretch one arm*  HEP      Thoracic 3 way stretch in chair        Serratus press        Alt flx/abd/ER        Gentle sleeper stretch 3x30 sec HEP      TB rows/ext  [place] 50# rows 3x10, 25# for ext 3x10      TB abd/flx        TB IR/ER  [place] 3x10 10# ER, 13# IR      TB ER at 90  Red TB 3x10 Green TB 3x10      No money's   X 30 blue TB     Posterior shoulder stretch*  HEP      IR behind back stretch  3x30 sec      Prone scapular program  4# x 10 ITY 4# ITY 3x10     Prone Ws  X 25  X 25     Wall washes   3x30 sec ea way     Scap stab red TB   2x10 ea     Full plank   PT demonstration     Side plank   PT demonstration     Ball bounces on wall  Red ball 3x30 Red ball 3x30     Body blade        Shelf lifts                Re-evaluation, discharge, HEP    Performed    Modalities        Ice and IFC

## 2019-04-08 ENCOUNTER — CLINICAL SUPPORT (OUTPATIENT)
Dept: URGENT CARE | Facility: CLINIC | Age: 39
End: 2019-04-08

## 2019-04-08 ENCOUNTER — APPOINTMENT (OUTPATIENT)
Dept: URGENT CARE | Facility: CLINIC | Age: 39
End: 2019-04-08

## 2019-04-08 ENCOUNTER — TRANSCRIBE ORDERS (OUTPATIENT)
Dept: URGENT CARE | Facility: CLINIC | Age: 39
End: 2019-04-08

## 2019-04-08 DIAGNOSIS — Z00.00 ANNUAL PHYSICAL EXAM: Primary | ICD-10-CM

## 2019-04-08 LAB
ALBUMIN SERPL BCP-MCNC: 4.3 G/DL (ref 3.5–5)
ALP SERPL-CCNC: 55 U/L (ref 46–116)
ALT SERPL W P-5'-P-CCNC: 90 U/L (ref 12–78)
ANION GAP SERPL CALCULATED.3IONS-SCNC: 5 MMOL/L (ref 4–13)
AST SERPL W P-5'-P-CCNC: 51 U/L (ref 5–45)
ATRIAL RATE: 53 BPM
BASOPHILS # BLD AUTO: 0.02 THOUSANDS/ΜL (ref 0–0.1)
BASOPHILS NFR BLD AUTO: 1 % (ref 0–1)
BILIRUB SERPL-MCNC: 2 MG/DL (ref 0.2–1)
BUN SERPL-MCNC: 12 MG/DL (ref 5–25)
CALCIUM SERPL-MCNC: 9.2 MG/DL (ref 8.3–10.1)
CHLORIDE SERPL-SCNC: 107 MMOL/L (ref 100–108)
CHOLEST SERPL-MCNC: 232 MG/DL (ref 50–200)
CO2 SERPL-SCNC: 27 MMOL/L (ref 21–32)
CREAT SERPL-MCNC: 1.16 MG/DL (ref 0.6–1.3)
EOSINOPHIL # BLD AUTO: 0.11 THOUSAND/ΜL (ref 0–0.61)
EOSINOPHIL NFR BLD AUTO: 3 % (ref 0–6)
ERYTHROCYTE [DISTWIDTH] IN BLOOD BY AUTOMATED COUNT: 11.9 % (ref 11.6–15.1)
GFR SERPL CREATININE-BSD FRML MDRD: 80 ML/MIN/1.73SQ M
GLUCOSE P FAST SERPL-MCNC: 85 MG/DL (ref 65–99)
HCT VFR BLD AUTO: 47.5 % (ref 36.5–49.3)
HDLC SERPL-MCNC: 37 MG/DL (ref 40–60)
HGB BLD-MCNC: 15.9 G/DL (ref 12–17)
IMM GRANULOCYTES # BLD AUTO: 0.01 THOUSAND/UL (ref 0–0.2)
IMM GRANULOCYTES NFR BLD AUTO: 0 % (ref 0–2)
LDLC SERPL CALC-MCNC: 153 MG/DL (ref 0–100)
LYMPHOCYTES # BLD AUTO: 0.98 THOUSANDS/ΜL (ref 0.6–4.47)
LYMPHOCYTES NFR BLD AUTO: 23 % (ref 14–44)
MCH RBC QN AUTO: 31.9 PG (ref 26.8–34.3)
MCHC RBC AUTO-ENTMCNC: 33.5 G/DL (ref 31.4–37.4)
MCV RBC AUTO: 95 FL (ref 82–98)
MONOCYTES # BLD AUTO: 0.48 THOUSAND/ΜL (ref 0.17–1.22)
MONOCYTES NFR BLD AUTO: 12 % (ref 4–12)
NEUTROPHILS # BLD AUTO: 2.59 THOUSANDS/ΜL (ref 1.85–7.62)
NEUTS SEG NFR BLD AUTO: 61 % (ref 43–75)
NONHDLC SERPL-MCNC: 195 MG/DL
NRBC BLD AUTO-RTO: 0 /100 WBCS
P AXIS: 7 DEGREES
PLATELET # BLD AUTO: 164 THOUSANDS/UL (ref 149–390)
PMV BLD AUTO: 12.1 FL (ref 8.9–12.7)
POTASSIUM SERPL-SCNC: 4 MMOL/L (ref 3.5–5.3)
PR INTERVAL: 158 MS
PROT SERPL-MCNC: 8 G/DL (ref 6.4–8.2)
QRS AXIS: -11 DEGREES
QRSD INTERVAL: 86 MS
QT INTERVAL: 438 MS
QTC INTERVAL: 410 MS
RBC # BLD AUTO: 4.99 MILLION/UL (ref 3.88–5.62)
SODIUM SERPL-SCNC: 139 MMOL/L (ref 136–145)
T WAVE AXIS: 9 DEGREES
TRIGL SERPL-MCNC: 209 MG/DL
VENTRICULAR RATE: 53 BPM
WBC # BLD AUTO: 4.19 THOUSAND/UL (ref 4.31–10.16)

## 2019-04-08 PROCEDURE — 93010 ELECTROCARDIOGRAM REPORT: CPT | Performed by: INTERNAL MEDICINE

## 2019-04-08 PROCEDURE — 93005 ELECTROCARDIOGRAM TRACING: CPT

## 2019-04-08 PROCEDURE — 80053 COMPREHEN METABOLIC PANEL: CPT | Performed by: NURSE PRACTITIONER

## 2019-04-08 PROCEDURE — 80061 LIPID PANEL: CPT | Performed by: NURSE PRACTITIONER

## 2019-04-08 PROCEDURE — 85025 COMPLETE CBC W/AUTO DIFF WBC: CPT | Performed by: NURSE PRACTITIONER

## 2019-08-05 ENCOUNTER — TELEPHONE (OUTPATIENT)
Dept: GASTROENTEROLOGY | Facility: MEDICAL CENTER | Age: 39
End: 2019-08-05

## 2019-08-05 NOTE — TELEPHONE ENCOUNTER
Patient spouse Jerry Joiner called stated  received a letter today to schedule a EGD procedure   Patient can be reached at [phone]

## 2019-08-07 ENCOUNTER — TELEPHONE (OUTPATIENT)
Dept: GASTROENTEROLOGY | Facility: AMBULARY SURGERY CENTER | Age: 39
End: 2019-08-07

## 2019-09-09 ENCOUNTER — ANESTHESIA EVENT (OUTPATIENT)
Dept: GASTROENTEROLOGY | Facility: HOSPITAL | Age: 39
End: 2019-09-09

## 2019-09-23 ENCOUNTER — ANESTHESIA (OUTPATIENT)
Dept: GASTROENTEROLOGY | Facility: HOSPITAL | Age: 39
End: 2019-09-23

## 2019-11-21 ENCOUNTER — HOSPITAL ENCOUNTER (OUTPATIENT)
Dept: GASTROENTEROLOGY | Facility: HOSPITAL | Age: 39
Setting detail: OUTPATIENT SURGERY
Discharge: HOME | End: 2019-11-21
Attending: INTERNAL MEDICINE | Admitting: INTERNAL MEDICINE
Payer: COMMERCIAL

## 2019-11-21 VITALS
SYSTOLIC BLOOD PRESSURE: 139 MMHG | HEART RATE: 68 BPM | OXYGEN SATURATION: 96 % | DIASTOLIC BLOOD PRESSURE: 73 MMHG | RESPIRATION RATE: 18 BRPM | BODY MASS INDEX: 49.02 KG/M2 | TEMPERATURE: 98 F | WEIGHT: 305 LBS | HEIGHT: 66 IN

## 2019-11-21 DIAGNOSIS — K21.9 GASTROESOPHAGEAL REFLUX DISEASE WITHOUT ESOPHAGITIS: ICD-10-CM

## 2019-11-21 PROCEDURE — 43235 EGD DIAGNOSTIC BRUSH WASH: CPT | Performed by: INTERNAL MEDICINE

## 2019-11-21 RX ORDER — PROPOFOL 10 MG/ML
INJECTION, EMULSION INTRAVENOUS AS NEEDED
Status: DISCONTINUED | OUTPATIENT
Start: 2019-11-21 | End: 2019-11-21 | Stop reason: SURG

## 2019-11-21 RX ORDER — SODIUM CHLORIDE, SODIUM LACTATE, POTASSIUM CHLORIDE, CALCIUM CHLORIDE 600; 310; 30; 20 MG/100ML; MG/100ML; MG/100ML; MG/100ML
100 INJECTION, SOLUTION INTRAVENOUS CONTINUOUS
Status: DISCONTINUED | OUTPATIENT
Start: 2019-11-21 | End: 2019-11-25 | Stop reason: HOSPADM

## 2019-11-21 RX ORDER — LIDOCAINE HYDROCHLORIDE 10 MG/ML
INJECTION, SOLUTION INFILTRATION; PERINEURAL AS NEEDED
Status: DISCONTINUED | OUTPATIENT
Start: 2019-11-21 | End: 2019-11-21 | Stop reason: SURG

## 2019-11-21 RX ADMIN — PROPOFOL 50 MG: 10 INJECTION, EMULSION INTRAVENOUS at 11:13

## 2019-11-21 RX ADMIN — PROPOFOL 100 MG: 10 INJECTION, EMULSION INTRAVENOUS at 11:11

## 2019-11-21 RX ADMIN — PROPOFOL 50 MG: 10 INJECTION, EMULSION INTRAVENOUS at 11:12

## 2019-11-21 RX ADMIN — LIDOCAINE HYDROCHLORIDE 100 MG: 10 INJECTION, SOLUTION INFILTRATION; PERINEURAL at 11:11

## 2019-11-21 RX ADMIN — SODIUM CHLORIDE, SODIUM LACTATE, POTASSIUM CHLORIDE, AND CALCIUM CHLORIDE: .6; .31; .03; .02 INJECTION, SOLUTION INTRAVENOUS at 10:28

## 2019-11-21 RX ADMIN — SODIUM CHLORIDE, SODIUM LACTATE, POTASSIUM CHLORIDE, AND CALCIUM CHLORIDE 100 ML/HR: .6; .31; .03; .02 INJECTION, SOLUTION INTRAVENOUS at 10:05

## 2019-11-21 NOTE — ANESTHESIA PREPROCEDURE EVALUATION
Review of Systems/Medical History  Patient summary reviewed  Chart reviewed  No history of anesthetic complications     Cardiovascular  Negative cardio ROS Exercise tolerance (METS): >4,     Pulmonary  No recent URI , No sleep apnea (spouse denies) ,        GI/Hepatic    GERD ,        Negative  ROS        Endo/Other  Negative endo/other ROS   Obesity (BMI 49)  morbid obesity   GYN       Hematology  Negative hematology ROS      Musculoskeletal  Negative musculoskeletal ROS        Neurology  Negative neurology ROS      Psychology   Negative psychology ROS              Physical Exam    Airway    Mallampati score: I  TM Distance: >3 FB  Neck ROM: full     Dental   No notable dental hx     Cardiovascular  Comment: Negative ROS,     Pulmonary      Other Findings        Anesthesia Plan  ASA Score- 3     Anesthesia Type- IV sedation with anesthesia with ASA Monitors  Additional Monitors:   Airway Plan:         Plan Factors-    Induction- intravenous  Postoperative Plan-     Informed Consent- Anesthetic plan and risks discussed with patient and spouse  I personally reviewed this patient with the CRNA  Discussed and agreed on the Anesthesia Plan with the CRNA  Ignacio Harris

## 2019-11-21 NOTE — H&P
History and Physical - [facility]  Renuka Steve 46 y o  male MRN: [id number]        HPI:  28-year-old male with history of chronic GERD  No abdominal pain, nausea or vomiting  Historical Information   Past Medical History:   Diagnosis Date    Acid reflux     History of viral meningitis     Varicose veins of both lower extremities      Past Surgical History:   Procedure Laterality Date    KNEE ARTHROSCOPY W/ LATERAL RELEASE Right     MT ENDOVENOUS LASER, 1ST VEIN Right 6/13/2017    Procedure: GSV EVLT W/ STAB PHLEBECTOMIES ;  Surgeon: Marti Alexandre MD;  [facility];  Service: Vascular    MT ESOPHAGOGASTRODUODENOSCOPY TRANSORAL DIAGNOSTIC N/A 7/19/2016    Procedure: ESOPHAGOGASTRODUODENOSCOPY (EGD); Surgeon: Melissa Earl MD;  [facility]; Service: Gastroenterology    SHOULDER ARTHROSCOPY W/ LABRAL REPAIR Left     WISDOM TOOTH EXTRACTION       Social History   Social History     Substance and Sexual Activity   Alcohol Use Yes    Alcohol/week: 4 0 standard drinks    Types: 4 Cans of beer per week    Frequency: 2-4 times a month    Drinks per session: 3 or 4    Binge frequency: Never    Comment: social, weekends      Social History     Substance and Sexual Activity   Drug Use No     Social History     Tobacco Use   Smoking Status Never Smoker   Smokeless Tobacco Never Used     History reviewed  No pertinent family history  Meds/Allergies       (Not in a hospital admission)    No Known Allergies    Objective     Blood pressure 138/80, pulse 64, temperature (!) 97 2 °F (36 2 °C), temperature source Temporal, resp  rate 18, height 5' 6" (1 676 m), weight (!) 138 kg (305 lb), SpO2 97 %      PHYSICAL EXAM:    Gen: NAD  CV: S1 & S2 normal, RRR  CHEST: Clear to auscultate  ABD: soft, NT/ND, good bowel sounds  EXT: no edema    ASSESSMENT:     History of GERD    PLAN:    EGD

## 2019-11-21 NOTE — ANESTHESIA POSTPROCEDURE EVALUATION
Post-Op Assessment Note    CV Status:  Stable  Pain Score: 0    Pain management: adequate     Mental Status:  Sleepy   Hydration Status:  Euvolemic   PONV Controlled:  Controlled   Airway Patency:  Patent   Post Op Vitals Reviewed: Yes      Staff: CRNA   Comments: vss sv nonobstructed uneventful          /69 (11/21/19 1120)    Temp 98 °F (36 7 °C) (11/21/19 1120)    Pulse 71 (11/21/19 1120)   Resp 18 (11/21/19 1120)    SpO2 98 % (11/21/19 1120)

## 2020-03-21 ENCOUNTER — HOSPITAL ENCOUNTER (EMERGENCY)
Facility: HOSPITAL | Age: 40
Discharge: HOME | End: 2020-03-21
Attending: EMERGENCY MEDICINE | Admitting: EMERGENCY MEDICINE
Payer: COMMERCIAL

## 2020-03-21 VITALS
WEIGHT: 299.8 LBS | SYSTOLIC BLOOD PRESSURE: 167 MMHG | HEART RATE: 77 BPM | OXYGEN SATURATION: 98 % | TEMPERATURE: 98.3 F | RESPIRATION RATE: 18 BRPM | DIASTOLIC BLOOD PRESSURE: 97 MMHG | BODY MASS INDEX: 48.39 KG/M2

## 2020-03-21 DIAGNOSIS — S61.219A FINGER LACERATION: Primary | ICD-10-CM

## 2020-03-21 PROCEDURE — 12001 RPR S/N/AX/GEN/TRNK 2.5CM/<: CPT | Performed by: EMERGENCY MEDICINE

## 2020-03-21 PROCEDURE — 99284 EMERGENCY DEPT VISIT MOD MDM: CPT | Performed by: EMERGENCY MEDICINE

## 2020-03-21 PROCEDURE — 99282 EMERGENCY DEPT VISIT SF MDM: CPT

## 2020-03-21 RX ORDER — LIDOCAINE HYDROCHLORIDE 10 MG/ML
10 INJECTION, SOLUTION EPIDURAL; INFILTRATION; INTRACAUDAL; PERINEURAL ONCE
Status: COMPLETED | OUTPATIENT
Start: 2020-03-21 | End: 2020-03-21

## 2020-03-21 RX ORDER — LIDOCAINE HYDROCHLORIDE 10 MG/ML
INJECTION, SOLUTION EPIDURAL; INFILTRATION; INTRACAUDAL; PERINEURAL
Status: COMPLETED
Start: 2020-03-21 | End: 2020-03-21

## 2020-03-21 RX ORDER — LIDOCAINE WITH 8.4% SOD BICARB 0.9%(10ML)
10 SYRINGE (ML) INJECTION ONCE
Status: DISCONTINUED | OUTPATIENT
Start: 2020-03-21 | End: 2020-03-21

## 2020-03-21 RX ORDER — GINSENG 100 MG
1 CAPSULE ORAL ONCE
Status: COMPLETED | OUTPATIENT
Start: 2020-03-21 | End: 2020-03-21

## 2020-03-21 RX ADMIN — LIDOCAINE HYDROCHLORIDE 10 ML: 10 INJECTION, SOLUTION EPIDURAL; INFILTRATION; INTRACAUDAL; PERINEURAL at 19:00

## 2020-03-21 RX ADMIN — BACITRACIN 1 SMALL APPLICATION: 500 OINTMENT TOPICAL at 19:40

## 2020-03-22 ENCOUNTER — OFFICE VISIT (OUTPATIENT)
Dept: URGENT CARE | Facility: MEDICAL CENTER | Age: 40
End: 2020-03-22
Payer: COMMERCIAL

## 2020-03-22 VITALS
HEIGHT: 66 IN | WEIGHT: 298 LBS | BODY MASS INDEX: 47.89 KG/M2 | HEART RATE: 92 BPM | OXYGEN SATURATION: 98 % | TEMPERATURE: 97.6 F | DIASTOLIC BLOOD PRESSURE: 88 MMHG | SYSTOLIC BLOOD PRESSURE: 144 MMHG | RESPIRATION RATE: 14 BRPM

## 2020-03-22 DIAGNOSIS — R05.9 COUGH: Primary | ICD-10-CM

## 2020-03-22 PROCEDURE — 87635 SARS-COV-2 COVID-19 AMP PRB: CPT | Performed by: PHYSICIAN ASSISTANT

## 2020-03-22 PROCEDURE — 87631 RESP VIRUS 3-5 TARGETS: CPT | Performed by: PHYSICIAN ASSISTANT

## 2020-03-22 PROCEDURE — G0382 LEV 3 HOSP TYPE B ED VISIT: HCPCS

## 2020-03-22 NOTE — PROGRESS NOTES
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  Denies chest pain      Review of Systems   Review of Systems   Constitutional: Negative  HENT: Positive for congestion  Negative for dental problem, drooling, ear pain, postnasal drip, rhinorrhea, sinus pain, sore throat, trouble swallowing and voice change  Eyes: Negative  Respiratory: Positive for cough  Negative for apnea, choking, chest tightness, shortness of breath, wheezing and stridor  Cardiovascular: Negative  Negative for chest pain  Current Medications       Current Outpatient Medications:     famotidine (PEPCID) 20 mg tablet, Take 1 tablet (20 mg total) by mouth 2 (two) times a day, Disp: 60 tablet, Rfl: 11    glucosamine-chondroitin 500-400 MG tablet, Take 1 tablet by mouth 3 (three) times a day, Disp: , Rfl:     Omega-3 Fatty Acids (FISH OIL) 645 MG CAPS, Take by mouth, Disp: , Rfl:   No current facility-administered medications for this visit       Current Allergies     Allergies as of 2020    (No Known Allergies)            The following portions of the patient's history were reviewed and updated as appropriate: allergies, current medications, past family history, past medical history, past social history, past surgical history and problem list      Past Medical History:   Diagnosis Date    Acid reflux     History of viral meningitis     Varicose veins of both lower extremities        Past Surgical History:   Procedure Laterality Date    KNEE ARTHROSCOPY W/ LATERAL RELEASE Right     SD ENDOVENOUS LASER, 1ST VEIN Right 6/13/2017    Procedure: GSV EVLT W/ STAB PHLEBECTOMIES ;  Surgeon: Violet Olguin MD;  [facility];  Service: Vascular    SD ESOPHAGOGASTRODUODENOSCOPY TRANSORAL DIAGNOSTIC N/A 7/19/2016    Procedure: ESOPHAGOGASTRODUODENOSCOPY (EGD); Surgeon: Josue Rivera MD;  [facility]; Service: Gastroenterology    SHOULDER ARTHROSCOPY W/ LABRAL REPAIR Left     WISDOM TOOTH EXTRACTION         No family history on file  Medications have been verified  Objective   There were no vitals taken for this visit  Physical Exam     Physical Exam   Constitutional: He appears well-developed and well-nourished  No distress  HENT:   Head: Normocephalic and atraumatic  Right Ear: Hearing, tympanic membrane, external ear and ear canal normal    Left Ear: Hearing, tympanic membrane, external ear and ear canal normal    Nose: Rhinorrhea present  Right sinus exhibits no maxillary sinus tenderness and no frontal sinus tenderness  Left sinus exhibits no maxillary sinus tenderness and no frontal sinus tenderness  Mouth/Throat: Uvula is midline, oropharynx is clear and moist and mucous membranes are normal    Neck: Normal range of motion  Neck supple  Cardiovascular: Normal rate, regular rhythm, normal heart sounds and intact distal pulses  Pulmonary/Chest: Effort normal and breath sounds normal  No respiratory distress  He has no wheezes  He has no rales  He exhibits no tenderness  Lymphadenopathy:     He has no cervical adenopathy  Skin: He is not diaphoretic

## 2020-03-22 NOTE — PATIENT INSTRUCTIONS
Cough  Follow up labs  Follow up with PCP in 3-5 days  Proceed to  ER if symptoms worsen  [address]    Your healthcare provider and/or public health staff have evaluated you and have determined that you do not need to remain in the hospital at this time  At this time you can be isolated at home where you will be monitored by staff from your local or state health department  You should carefully follow the prevention and isolation steps below until a healthcare provider or local or state health department says that you can return to your normal activities  Stay home except to get medical care    People who are mildly ill with COVID-19 are able to isolate at home during their illness  You should restrict activities outside your home, except for getting medical care  Do not go to work, school, or public areas  Avoid using public transportation, ride-sharing, or taxis  Separate yourself from other people and animals in your home    People: As much as possible, you should stay in a specific room and away from other people in your home  Also, you should use a separate bathroom, if available  Animals: You should restrict contact with pets and other animals while you are sick with COVID-19, just like you would around other people  Although there have not been reports of pets or other animals becoming sick with COVID-19, it is still recommended that people sick with COVID-19 limit contact with animals until more information is known about the virus  When possible, have another member of your household care for your animals while you are sick  If you are sick with COVID-19, avoid contact with your pet, including petting, snuggling, being kissed or licked, and sharing food  If you must care for your pet or be around animals while you are sick, wash your hands before and after you interact with pets and wear a facemask  See COVID-19 and Animals for more information      Call ahead before visiting your doctor    If you have a medical appointment, call the healthcare provider and tell them that you have or may have COVID-19  This will help the healthcare providers office take steps to keep other people from getting infected or exposed  Wear a facemask    You should wear a facemask when you are around other people (e g , sharing a room or vehicle) or pets and before you enter a healthcare providers office  If you are not able to wear a facemask (for example, because it causes trouble breathing), then people who live with you should not stay in the same room with you, or they should wear a facemask if they enter your room  Cover your coughs and sneezes    Cover your mouth and nose with a tissue when you cough or sneeze  Throw used tissues in a lined trash can  Immediately wash your hands with soap and water for at least 20 seconds or, if soap and water are not available, clean your hands with an alcohol-based hand  that contains at least 60% alcohol  Clean your hands often    Wash your hands often with soap and water for at least 20 seconds, especially after blowing your nose, coughing, or sneezing; going to the bathroom; and before eating or preparing food  If soap and water are not readily available, use an alcohol-based hand  with at least 60% alcohol, covering all surfaces of your hands and rubbing them together until they feel dry  Soap and water are the best option if hands are visibly dirty  Avoid touching your eyes, nose, and mouth with unwashed hands  Avoid sharing personal household items    You should not share dishes, drinking glasses, cups, eating utensils, towels, or bedding with other people or pets in your home  After using these items, they should be washed thoroughly with soap and water      Clean all high-touch surfaces everyday    High touch surfaces include counters, tabletops, doorknobs, bathroom fixtures, toilets, phones, keyboards, tablets, and bedside tables  Also, clean any surfaces that may have blood, stool, or body fluids on them  Use a household cleaning spray or wipe, according to the label instructions  Labels contain instructions for safe and effective use of the cleaning product including precautions you should take when applying the product, such as wearing gloves and making sure you have good ventilation during use of the product  Monitor your symptoms    Seek prompt medical attention if your illness is worsening (e g , difficulty breathing)  Before seeking care, call your healthcare provider and tell them that you have, or are being evaluated for, COVID-19  Put on a facemask before you enter the facility  These steps will help the healthcare providers office to keep other people in the office or waiting room from getting infected or exposed  Ask your healthcare provider to call the local or state health department  Persons who are placed under active monitoring or facilitated self-monitoring should follow instructions provided by their local health department or occupational health professionals, as appropriate  If you have a medical emergency and need to call 911, notify the dispatch personnel that you have, or are being evaluated for COVID-19  If possible, put on a facemask before emergency medical services arrive  Discontinuing home isolation    Patients with confirmed COVID-19 should remain under home isolation precautions until the risk of secondary transmission to others is thought to be low  The decision to discontinue home isolation precautions should be made on a case-by-case basis, in consultation with healthcare providers and state and local health departments      Source: [employer]

## 2020-03-23 LAB
FLUAV RNA NPH QL NAA+PROBE: NORMAL
FLUBV RNA NPH QL NAA+PROBE: NORMAL
RSV RNA NPH QL NAA+PROBE: NORMAL

## 2020-03-25 LAB — SARS-COV-2 RNA SPEC QL NAA+PROBE: NOT DETECTED

## 2021-03-15 ENCOUNTER — APPOINTMENT (EMERGENCY)
Dept: VASCULAR ULTRASOUND | Facility: HOSPITAL | Age: 41
End: 2021-03-15
Payer: COMMERCIAL

## 2021-03-15 ENCOUNTER — HOSPITAL ENCOUNTER (EMERGENCY)
Facility: HOSPITAL | Age: 41
Discharge: HOME | End: 2021-03-15
Attending: EMERGENCY MEDICINE | Admitting: EMERGENCY MEDICINE
Payer: COMMERCIAL

## 2021-03-15 VITALS
OXYGEN SATURATION: 98 % | BODY MASS INDEX: 48.42 KG/M2 | WEIGHT: 300 LBS | DIASTOLIC BLOOD PRESSURE: 97 MMHG | RESPIRATION RATE: 18 BRPM | SYSTOLIC BLOOD PRESSURE: 164 MMHG | TEMPERATURE: 98.1 F | HEART RATE: 85 BPM

## 2021-03-15 DIAGNOSIS — R09.89 FIRM LYMPH NODE: Primary | ICD-10-CM

## 2021-03-15 PROCEDURE — 93971 EXTREMITY STUDY: CPT | Performed by: SURGERY

## 2021-03-15 PROCEDURE — 99284 EMERGENCY DEPT VISIT MOD MDM: CPT | Performed by: EMERGENCY MEDICINE

## 2021-03-15 PROCEDURE — 99284 EMERGENCY DEPT VISIT MOD MDM: CPT

## 2021-03-15 PROCEDURE — 93971 EXTREMITY STUDY: CPT

## 2021-03-15 NOTE — ED PROVIDER NOTES
History  Chief Complaint   Patient presents with    Groin Pain     pt states starting 8 days ago he noticed a lump in his groin on right right side, firm but denies redness/warmth  no injuries to area  hx blood clot in leg  no swelling to leg/area  no urinary issues     HPI     23-year-old male presenting to the emergency department with left inguinal firm masses that he noticed 8 days ago  Past medical history is significant for DVT  Patient denies any pain, redness, leakage from the area  Denies history of STDs  Denies penile discharge, urinary symptoms, chest pain, shortness of breath, leg swelling  Prior to this, patient was in a state of usual health  Denies recent fever, vomiting, abdominal pain, changes in stool, leg pain, rash  Patient does not shave his pubic region  Does not have history of abscesses or growths in the area  Prior to Admission Medications   Prescriptions Last Dose Informant Patient Reported? Taking? Omega-3 Fatty Acids (FISH OIL) 645 MG CAPS   Yes No   Sig: Take by mouth   famotidine (PEPCID) 20 mg tablet   No No   Sig: Take 1 tablet (20 mg total) by mouth 2 (two) times a day   glucosamine-chondroitin 500-400 MG tablet   Yes No   Sig: Take 1 tablet by mouth 3 (three) times a day      Facility-Administered Medications: None       Past Medical History:   Diagnosis Date    Acid reflux     History of viral meningitis     Varicose veins of both lower extremities        Past Surgical History:   Procedure Laterality Date    KNEE ARTHROSCOPY W/ LATERAL RELEASE Right     [place] ENDOVENOUS LASER, 1ST VEIN Right 6/13/2017    Procedure: GSV EVLT W/ STAB PHLEBECTOMIES ;  Surgeon: Epifanio Olguin MD;  Location: [facility];  Service: Vascular    [place] ESOPHAGOGASTRODUODENOSCOPY TRANSORAL DIAGNOSTIC N/A 7/19/2016    Procedure: ESOPHAGOGASTRODUODENOSCOPY (EGD); Surgeon: Lesvia Miner MD;  Location: [facility];   Service: Gastroenterology    SHOULDER ARTHROSCOPY W/ LABRAL REPAIR Left     SUKH TOOTH EXTRACTION         History reviewed  No pertinent family history  I have reviewed and agree with the history as documented  E-Cigarette/Vaping     E-Cigarette/Vaping Substances     Social History     Tobacco Use    Smoking status: Never Smoker    Smokeless tobacco: Never Used   Substance Use Topics    Alcohol use: Yes     Alcohol/week: 4 0 standard drinks     Types: 4 Cans of beer per week     Frequency: 2-4 times a month     Drinks per session: 3 or 4     Binge frequency: Never     Comment: social, weekends     Drug use: No       Review of Systems   Constitutional: Negative for activity change, chills and fever  HENT: Negative for sneezing and sore throat  Eyes: Negative for pain  Respiratory: Negative for apnea, cough, choking, chest tightness, shortness of breath, wheezing and stridor  Cardiovascular: Negative for chest pain, palpitations and leg swelling  Gastrointestinal: Negative for abdominal distention, abdominal pain, anal bleeding, blood in stool, constipation, diarrhea, rectal pain and vomiting  Genitourinary: Negative for dysuria and hematuria  Musculoskeletal: Negative for back pain, gait problem, myalgias, neck pain and neck stiffness  Skin: Negative for color change, pallor, rash and wound  Mass   Neurological: Negative for dizziness, tremors, seizures, syncope, facial asymmetry, speech difficulty, weakness, light-headedness, numbness and headaches  Physical Exam  Physical Exam  Vitals signs and nursing note reviewed  Constitutional:       General: He is not in acute distress  Appearance: He is well-developed  He is not diaphoretic  HENT:      Head: Normocephalic and atraumatic  Right Ear: External ear normal       Left Ear: External ear normal       Nose: Nose normal    Eyes:      General:         Right eye: No discharge  Left eye: No discharge        Conjunctiva/sclera: Conjunctivae normal       Pupils: Pupils are equal, round, and reactive to light  Neck:      Musculoskeletal: Normal range of motion and neck supple  Cardiovascular:      Rate and Rhythm: Normal rate and regular rhythm  Heart sounds: Normal heart sounds  No friction rub  No gallop  Pulmonary:      Effort: Pulmonary effort is normal  No respiratory distress  Breath sounds: Normal breath sounds  No stridor  No wheezing or rales  Chest:      Chest wall: No tenderness  Abdominal:      General: Bowel sounds are normal       Palpations: Abdomen is soft  Tenderness: There is no abdominal tenderness  Musculoskeletal: Normal range of motion  General: No swelling, tenderness, deformity or signs of injury  Right lower leg: No edema  Left lower leg: No edema  Skin:     General: Skin is warm and dry  Capillary Refill: Capillary refill takes less than 2 seconds  Findings: No bruising or rash  Comments: 2 firm nonmobile masses measuring 4 cm and 5 cm, nonfluctuant, nontender  No redness, warmth, discharge  Neurological:      Mental Status: He is alert and oriented to person, place, and time           Vital Signs  ED Triage Vitals [03/15/21 1318]   Temperature Pulse Respirations Blood Pressure SpO2   98 1 °F (36 7 °C) 85 18 164/97 98 %      Temp Source Heart Rate Source Patient Position - Orthostatic VS BP Location FiO2 (%)   Oral Monitor Sitting Right arm --      Pain Score       --           Vitals:    03/15/21 1318   BP: 164/97   Pulse: 85   Patient Position - Orthostatic VS: Sitting         Visual Acuity      ED Medications  Medications - No data to display    Diagnostic Studies  Results Reviewed     Procedure Component Value Units Date/Time    D-dimer, quantitative [[id number]]     Lab Status: No result Specimen: Blood                  No orders to display              Procedures  Procedures         ED Course  ED Course as of Mar 15 1516   Mon Mar 15, 2021   [age]-year-old male presenting to the emergency department with hard nonmobile mass in the left inguinal region  Vital signs upon arrival within normal limits  Physical exam is remarkable for 2 hard nonmobile masses measuring 4 cm and 5 cm, nonfluctuant, nontender  No redness, warmth, discharge  Differential diagnosis includes blood clot, calcification, cyst, lymph nodes, abscess, lipoma  Doubt infectious etiology given lack of infectious symptoms and lack of pain  Ultrasound ordered  1509 technical findings are negative for DVT or right lower extremity  Evidence of possible small lymph nodes noted in the right groin in are of palpable lumps   VAS lower limb venous duplex study, unilateral/limited   1516 Patient remained clinically stable in the emergency department for 2 hours and 12 minutes without evidence of impending cardiopulmonary instability  Recommended the patient follow-up with a specialist for lymph node biopsy  Patient verbalized understanding and will follow up as an outpatient  Specialist was provided  Upon discharge, patient was fully alert, oriented, GCS 15, ambulatory, without any further complaints  MDM    Disposition  Final diagnoses:   None     ED Disposition     None      Follow-up Information    None         Patient's Medications   Discharge Prescriptions    No medications on file     No discharge procedures on file      PDMP Review     None          ED Provider  Electronically Signed by           Tristian Mendiola MD  03/15/21 9924

## 2021-03-15 NOTE — DISCHARGE INSTRUCTIONS
Please make sure to follow-up with a specialist to get a biopsy done of your lymph nodes to make sure that there are no abnormalities  If you develop night sweats, fatigue, sudden weight loss, chest pain, shortness of breath, or any other concerning symptoms, please return to the emergency department as these could be signs of worsening illness

## 2021-03-23 ENCOUNTER — OFFICE VISIT (OUTPATIENT)
Dept: SURGERY | Facility: CLINIC | Age: 41
End: 2021-03-23
Payer: COMMERCIAL

## 2021-03-23 VITALS — HEIGHT: 66 IN | BODY MASS INDEX: 48.21 KG/M2 | WEIGHT: 300 LBS

## 2021-03-23 DIAGNOSIS — R09.89 FIRM LYMPH NODE: ICD-10-CM

## 2021-03-23 PROCEDURE — 99242 OFF/OP CONSLTJ NEW/EST SF 20: CPT | Performed by: SURGERY

## 2021-03-23 NOTE — PROGRESS NOTES
Assessment/Plan: patient presents with right inguinal adenopathy  This was noted on self exam   He has a history for a right posterior tibial deep vein thrombosis in 2019  Initial concern was recurrent venous thrombosis as he also has significant saphenous vein varicosities  Ultrasound reveals no evidence for superficial or deep vein thrombosis  A 1 2 cm lymph node was noted over the area of  Palpable concern  Is mobile  He states that it has  Decreased incised over the last few days  Denies any fever or chills  No constitutional symptoms  No night sweats or shortness of breath  No rash or history of an abscess  Examination reveals no evidence for supraclavicular, axillary or left inguinal adenopathy  There is a singular lymph node which is palpable over the adductor musculature in the right medial inguinal region  This is adjacent to saphenous varicosities  No evidence for additional adenopathy  It is mobile  It Is presently nontender  I believe this is most consistent with reactive adenopathy  He is comfortable monitoring this region and returning should it enlarge in size  Ongoing observation is therefore recommended  Diagnoses and all orders for this visit:    Firm lymph node  -     Ambulatory referral to General Surgery    Other orders  -     TURMERIC PO; Take by mouth        Subjective:      Patient ID: Gal Mcghee is a 45 y o  male  Patient presents for evaluation of a lump in his right groin  He has had the lump for 15 days  The lump has decreased in size and he denies pain  VAS lower limb venous duplex 3/15/2021 Tech Note:  There is an echogenic structure located in the inguinal region (area of  palpable lump)  This structure measures approximately 1 2 cm and is consistent  with enlarged lymph node and channels           The following portions of the patient's history were reviewed and updated as appropriate:     He  has a past medical history of Acid reflux, History of viral meningitis, and Varicose veins of both lower extremities  He  has a past surgical history that includes Knee arthroscopy w/ lateral release (Right); Shoulder arthroscopy w/ labral repair (Left); pr esophagogastroduodenoscopy transoral diagnostic (N/A, 7/19/2016); pr endovenous laser, 1st vein (Right, 6/13/2017); and [place] tooth extraction  His family history is not on file  He  reports that he has never smoked  He has never used smokeless tobacco  He reports current alcohol use of about 4 0 standard drinks of alcohol per week  He reports that he does not use drugs  Current Outpatient Medications   Medication Sig Dispense Refill    famotidine (PEPCID) 20 mg tablet Take 1 tablet (20 mg total) by mouth 2 (two) times a day 60 tablet 11    glucosamine-chondroitin 500-400 MG tablet Take 1 tablet by mouth 3 (three) times a day      TURMERIC PO Take by mouth      Omega-3 Fatty Acids (FISH OIL) 645 MG CAPS Take by mouth       No current facility-administered medications for this visit  He has No Known Allergies       Review of Systems   Constitutional: Negative  Negative for activity change, diaphoresis, fatigue, fever and unexpected weight change  HENT: Negative  Eyes: Negative  Respiratory: Negative  Cardiovascular: Negative  Gastrointestinal: Negative  Endocrine: Negative  Genitourinary: Negative  Musculoskeletal: Negative  Skin: Negative  Allergic/Immunologic: Negative  Neurological: Negative  Psychiatric/Behavioral: Negative for agitation, behavioral problems and confusion  The patient is not nervous/anxious  All other systems reviewed and are negative  Objective:      Ht 5' 6" (1 676 m)   Wt 136 kg (300 lb)   BMI 48 42 kg/m²          Physical Exam  Constitutional:       Appearance: He is well-developed  He is not diaphoretic  HENT:      Head: Normocephalic and atraumatic  Eyes:      General: No scleral icterus          Right eye: No discharge  Left eye: No discharge  Extraocular Movements: Extraocular movements intact  Conjunctiva/sclera: Conjunctivae normal    Neck:      Musculoskeletal: Normal range of motion and neck supple  No muscular tenderness  Thyroid: No thyromegaly  Trachea: No tracheal deviation  Comments: No evidence for supraclavicular or axillary adenopathy  Cardiovascular:      Rate and Rhythm: Normal rate and regular rhythm  Heart sounds: Normal heart sounds  No murmur  No friction rub  Pulmonary:      Effort: Pulmonary effort is normal  No respiratory distress  Breath sounds: No stridor  No wheezing  Chest:      Chest wall: No tenderness  Abdominal:      General: There is no distension  Palpations: Abdomen is soft  There is no mass  Tenderness: There is no abdominal tenderness  There is no guarding or rebound  Comments: Singular lymph node is noted in the right medial inguinal crease over the adductor musculature  Is mobile and nontender  Saphenous varicosities are present bilaterally  Musculoskeletal: Normal range of motion  General: No tenderness  Lymphadenopathy:      Cervical: No cervical adenopathy  Skin:     General: Skin is warm and dry  Findings: No erythema or rash  Neurological:      Mental Status: He is alert and oriented to person, place, and time  Cranial Nerves: No cranial nerve deficit        Coordination: Coordination normal    Psychiatric:         Behavior: Behavior normal          Judgment: Judgment normal

## 2021-08-18 ENCOUNTER — APPOINTMENT (OUTPATIENT)
Dept: LAB | Facility: CLINIC | Age: 41
End: 2021-08-18

## 2021-08-18 ENCOUNTER — TRANSCRIBE ORDERS (OUTPATIENT)
Dept: LAB | Facility: CLINIC | Age: 41
End: 2021-08-18

## 2021-08-18 DIAGNOSIS — Z00.8 ENCOUNTER FOR OTHER GENERAL EXAMINATION: Primary | ICD-10-CM

## 2021-08-18 DIAGNOSIS — Z00.8 ENCOUNTER FOR OTHER GENERAL EXAMINATION: ICD-10-CM

## 2021-08-18 LAB
CHOLEST SERPL-MCNC: 274 MG/DL (ref 50–200)
EST. AVERAGE GLUCOSE BLD GHB EST-MCNC: 103 MG/DL
HBA1C MFR BLD: 5.2 %
HDLC SERPL-MCNC: 41 MG/DL
NONHDLC SERPL-MCNC: 233 MG/DL
TRIGL SERPL-MCNC: 431 MG/DL

## 2021-08-18 PROCEDURE — 36415 COLL VENOUS BLD VENIPUNCTURE: CPT

## 2021-08-18 PROCEDURE — 80061 LIPID PANEL: CPT

## 2021-08-18 PROCEDURE — 83036 HEMOGLOBIN GLYCOSYLATED A1C: CPT

## 2021-08-27 NOTE — ED PROVIDER NOTES
History  Chief Complaint   Patient presents with    Finger Laceration     sustained laceration of left ring finger on lid of metal can  pressure dressing applied        Finger Laceration   Location:  Finger  Finger laceration location:  L ring finger  Length:  2cm  Depth:  Cutaneous  Bleeding: controlled    Time since incident:  1 hour  Laceration mechanism:  Metal edge  Pain details:     Quality:  Aching    Severity:  Mild    Timing:  Constant    Progression:  Unchanged  Foreign body present:  No foreign bodies  Relieved by:  Nothing  Worsened by:  Pressure and movement  Ineffective treatments:  None tried  Tetanus status:  Unknown  Associated symptoms: no fever, no focal weakness, no numbness, no redness, no swelling and no streaking        Prior to Admission Medications   Prescriptions Last Dose Informant Patient Reported? Taking? Omega-3 Fatty Acids (FISH OIL) 645 MG CAPS   Yes No   Sig: Take by mouth   famotidine (PEPCID) 20 mg tablet   No No   Sig: Take 1 tablet (20 mg total) by mouth 2 (two) times a day   glucosamine-chondroitin 500-400 MG tablet   Yes No   Sig: Take 1 tablet by mouth 3 (three) times a day      Facility-Administered Medications: None       Past Medical History:   Diagnosis Date    Acid reflux     History of viral meningitis     Varicose veins of both lower extremities        Past Surgical History:   Procedure Laterality Date    KNEE ARTHROSCOPY W/ LATERAL RELEASE Right     [place] ENDOVENOUS LASER, 1ST VEIN Right 6/13/2017    Procedure: GSV EVLT W/ STAB PHLEBECTOMIES ;  Surgeon: Carlos Olguin MD;  [facility];  Service: Vascular    [place] ESOPHAGOGASTRODUODENOSCOPY TRANSORAL DIAGNOSTIC N/A 7/19/2016    Procedure: ESOPHAGOGASTRODUODENOSCOPY (EGD); Surgeon: eLx Mackay MD;  [facility]: [facility]; Service: Gastroenterology    SHOULDER ARTHROSCOPY W/ LABRAL REPAIR Left     WISDOM TOOTH EXTRACTION         History reviewed  No pertinent family history    I have reviewed and agree with the history as documented  E-Cigarette/Vaping     E-Cigarette/Vaping Substances     Social History     Tobacco Use    Smoking status: Never Smoker    Smokeless tobacco: Never Used   Substance Use Topics    Alcohol use: Yes     Alcohol/week: 4 0 standard drinks     Types: 4 Cans of beer per week     Frequency: 2-4 times a month     Drinks per session: 3 or 4     Binge frequency: Never     Comment: social, weekends     Drug use: No       Review of Systems   Constitutional: Negative for fever  Neurological: Negative for focal weakness  All other systems reviewed and are negative  Physical Exam  Physical Exam   Constitutional: He appears well-developed and well-nourished  HENT:   Head: Normocephalic and atraumatic  Eyes: No scleral icterus  Cardiovascular: Intact distal pulses  Pulmonary/Chest: Effort normal  No respiratory distress  Musculoskeletal: Normal range of motion  He exhibits no deformity  Skin: Skin is warm and dry  Capillary refill takes less than 2 seconds  Laceration (2cm ventral surface of left ring pad,  no active bleeding) noted  Psychiatric: He has a normal mood and affect   His behavior is normal  Judgment and thought content normal        Vital Signs  ED Triage Vitals [03/21/20 1839]   Temperature Pulse Respirations Blood Pressure SpO2   98 3 °F (36 8 °C) 77 18 167/97 98 %      Temp Source Heart Rate Source Patient Position - Orthostatic VS BP Location FiO2 (%)   Oral -- -- Right arm --      Pain Score       2           Vitals:    03/21/20 1839   BP: 167/97   Pulse: 77         Visual Acuity      ED Medications  Medications   lidocaine (PF) (XYLOCAINE-MPF) 1 % injection 10 mL (has no administration in time range)       Diagnostic Studies  Results Reviewed     None                 No orders to display              Procedures  Laceration repair  Date/Time: 3/21/2020 11:35 PM  Performed by: Janki Reed MD  Authorized by: Janki Reed MD   Consent: Verbal consent obtained  Risks and benefits: risks, benefits and alternatives were discussed  Consent given by: patient  Patient understanding: patient states understanding of the procedure being performed  Patient consent: the patient's understanding of the procedure matches consent given  Procedure consent: procedure consent matches procedure scheduled  Relevant documents: relevant documents present and verified  Test results: test results available and properly labeled  Site marked: the operative site was marked  Radiology Images displayed and confirmed  If images not available, report reviewed: imaging studies available  Required items: required blood products, implants, devices, and special equipment available  Laceration length: 2 cm  Foreign bodies: no foreign bodies  Tendon involvement: none  Nerve involvement: none  Vascular damage: no  Anesthesia: digital block and local infiltration    Anesthesia:  Local Anesthetic: lidocaine 1% without epinephrine  Anesthetic total: 3 mL    Sedation:  Patient sedated: no      Wound Dehiscence:  Superficial Wound Dehiscence: simple closure      Procedure Details:  Irrigation solution: saline  Irrigation method: jet lavage and syringe  Amount of cleaning: standard  Debridement: none  Degree of undermining: none  Skin closure: 4-0 nylon  Number of sutures: 4  Technique: simple  Approximation: close  Approximation difficulty: simple  Dressing: antibiotic ointment and gauze roll               ED Course                                 MDM  Number of Diagnoses or Management Options  Finger laceration:   Diagnosis management comments: Superficial laceration to left ventral ring finger, NV intact distal to injury, wound irrigated extensively, approximated with sutures  Pt to return in 7 days for suture removal, sooner if signs of infection           Disposition  Final diagnoses:   None     ED Disposition     None      Follow-up Information    None         Patient's Medications   Discharge Prescriptions    No medications on file     No discharge procedures on file      PDMP Review     None          ED Provider  Electronically Signed by           Brendon Palmer MD  03/21/20 8669 Patient received semi-stringer in bed in NAD on RA, +[place]. Cleared by RN Gayatri and MD. Agreeable to PT.

## 2022-01-05 ENCOUNTER — OFFICE VISIT (OUTPATIENT)
Dept: GASTROENTEROLOGY | Facility: AMBULARY SURGERY CENTER | Age: 42
End: 2022-01-05
Payer: COMMERCIAL

## 2022-01-05 VITALS
BODY MASS INDEX: 50.62 KG/M2 | DIASTOLIC BLOOD PRESSURE: 88 MMHG | SYSTOLIC BLOOD PRESSURE: 160 MMHG | HEIGHT: 66 IN | WEIGHT: 315 LBS

## 2022-01-05 DIAGNOSIS — R10.12 LEFT UPPER QUADRANT ABDOMINAL PAIN: Primary | ICD-10-CM

## 2022-01-05 DIAGNOSIS — K62.5 BRBPR (BRIGHT RED BLOOD PER RECTUM): ICD-10-CM

## 2022-01-05 PROCEDURE — 99214 OFFICE O/P EST MOD 30 MIN: CPT | Performed by: INTERNAL MEDICINE

## 2022-01-05 RX ORDER — SODIUM PICOSULFATE, MAGNESIUM OXIDE, AND ANHYDROUS CITRIC ACID 10; 3.5; 12 MG/160ML; G/160ML; G/160ML
1 LIQUID ORAL SEE ADMIN INSTRUCTIONS
Qty: 320 ML | Refills: 0 | Status: SHIPPED | OUTPATIENT
Start: 2022-01-05 | End: 2022-01-07

## 2022-01-05 RX ORDER — AMOXICILLIN 500 MG/1
CAPSULE ORAL
COMMUNITY
End: 2022-03-03

## 2022-01-05 RX ORDER — OMEPRAZOLE 20 MG/1
CAPSULE, DELAYED RELEASE ORAL
COMMUNITY
End: 2022-03-03

## 2022-01-05 NOTE — PATIENT INSTRUCTIONS
Scheduled date of EGD/colonoscopy (as of today):3/3/2022  Physician performing EGD/colonoscopy:DR HASKINS  [facility] of [facility]oscopy[facility]  Desired bowel prep reviewed with patient:BUCK PER DR HASKINS/COUIDANIA GIVEN AT CHECKOUT  Instructions reviewed with patient by:DIXON WILEY  Clearances:

## 2022-01-05 NOTE — ASSESSMENT & PLAN NOTE
Rule out peptic ulcer disease or gastric erosions rule out H pylori gastritis  Also concerns for any colon pathology since his pain improves after bowel movement     -schedule for upper endoscopy    -continue famotidine    -Patient was explained about the lifestyle and dietary modifications  Advised to avoid fatty foods, chocolates, caffeine, alcohol and any other triggering foods  Avoid eating for at least 3 hours before going to bed

## 2022-01-05 NOTE — ASSESSMENT & PLAN NOTE
Most likely from hemorrhoids  Rule out colorectal lesions including polyps or malignancy     -Schedule for colonoscopy  -High-fiber diet     -Patient was given instructions about the colonoscopy prep     -Patient was explained about  the risks and benefits of the procedure  Risks including but not limited to bleeding, infection, perforation were explained in detail  Also explained about less than 100% sensitivity with the exam and other alternatives

## 2022-01-05 NOTE — PROGRESS NOTES
Follow-up Note -  [facility]  Sania Kc 1980 male         Reason:  Abdominal pain    HPI:  Mr Anastasiia Christian came in because of left upper quadrant pain for couple of years  He describes as dull pain that becomes sharp at times mostly in the morning and gets better after a good bowel movement  He has history of GERD for which she takes famotidine 20 mg twice daily  He denies any NSAID use now  Last upper endoscopy was couple of years ago which showed a small hiatal hernia  Reports having occasional fresh blood from the hemorrhoids  Good appetite, no recent weight loss  Denies any nausea or vomiting  REVIEW OF SYSTEMS: Review of Systems   Constitutional: Negative for activity change, appetite change, chills, diaphoresis, fatigue, fever and unexpected weight change  HENT: Negative for ear discharge, ear pain, facial swelling, hearing loss, nosebleeds, sore throat, tinnitus and voice change  Eyes: Negative for pain, discharge, redness, itching and visual disturbance  Respiratory: Negative for apnea, cough, chest tightness, shortness of breath and wheezing  Cardiovascular: Negative for chest pain and palpitations  Gastrointestinal:        As noted in HPI   Endocrine: Negative for cold intolerance, heat intolerance and polyuria  Genitourinary: Negative for difficulty urinating, dysuria, flank pain, hematuria and urgency  Musculoskeletal: Positive for arthralgias  Negative for back pain, gait problem, joint swelling and myalgias  Skin: Negative for rash and wound  Neurological: Negative for dizziness, tremors, seizures, speech difficulty, light-headedness, numbness and headaches  Hematological: Negative for adenopathy  Does not bruise/bleed easily  Psychiatric/Behavioral: Negative for agitation, behavioral problems and confusion  The patient is not nervous/anxious           Past Medical History:   Diagnosis Date    Acid reflux     History of viral meningitis     Varicose veins of both lower extremities       Past Surgical History:   Procedure Laterality Date    KNEE ARTHROSCOPY W/ LATERAL RELEASE Right     [place] ENDOVENOUS LASER, 1ST VEIN Right 6/13/2017    Procedure: GSV EVLT W/ STAB PHLEBECTOMIES ;  Surgeon: Jalil Niño MD;  [facility];  Service: Vascular    ID ESOPHAGOGASTRODUODENOSCOPY TRANSORAL DIAGNOSTIC N/A 7/19/2016    Procedure: ESOPHAGOGASTRODUODENOSCOPY (EGD); Surgeon: Valerie Baker MD;  [facility]; Service: Gastroenterology    SHOULDER ARTHROSCOPY W/ LABRAL REPAIR Left     WISDOM TOOTH EXTRACTION       Social History     Socioeconomic History    Marital status: /Civil Union     Spouse name: Not on file    Number of children: Not on file    Years of education: Not on file    Highest education level: Not on file   Occupational History    Not on file   Tobacco Use    Smoking status: Never Smoker    Smokeless tobacco: Never Used   Substance and Sexual Activity    Alcohol use: Yes     Alcohol/week: 4 0 standard drinks     Types: 4 Cans of beer per week     Comment: social, weekends     Drug use: No    Sexual activity: Not on file   Other Topics Concern    Not on file   Social History Narrative    Not on file     Social Determinants of Health     Financial Resource Strain: Not on file   Food Insecurity: Not on file   Transportation Needs: Not on file   Physical Activity: Not on file   Stress: Not on file   Social Connections: Not on file   Intimate Partner Violence: Not on file   Housing Stability: Not on file     No family history on file  Patient has no known allergies    Current Outpatient Medications   Medication Sig Dispense Refill    famotidine (PEPCID) 20 mg tablet Take 1 tablet (20 mg total) by mouth 2 (two) times a day 60 tablet 11    glucosamine-chondroitin 500-400 MG tablet Take 1 tablet by mouth 3 (three) times a day      TURMERIC PO Take by mouth      amoxicillin (AMOXIL) 500 mg capsule amoxicillin 500 mg capsule (Patient not taking: Reported on 1/5/2022)      Omega-3 Fatty Acids (FISH OIL) 645 MG CAPS Take by mouth (Patient not taking: Reported on 1/5/2022 )      omeprazole (PriLOSEC) 20 mg delayed release capsule omeprazole 20 mg capsule,delayed release (Patient not taking: Reported on 1/5/2022)      Sod Picosulfate-Mag Ox-Cit Acd (Clenpiq) 10-3 5-12 MG-GM -GM/160ML SOLN Take 1 Package by mouth see administration instructions 320 mL 0     No current facility-administered medications for this visit  Blood pressure 160/88, height 5' 6" (1 676 m), weight (!) 143 kg (316 lb)  PHYSICAL EXAM: Physical Exam  Constitutional:       Appearance: He is well-developed  HENT:      Head: Normocephalic and atraumatic  Eyes:      General: No scleral icterus  Right eye: No discharge  Left eye: No discharge  Conjunctiva/sclera: Conjunctivae normal       Pupils: Pupils are equal, round, and reactive to light  Neck:      Thyroid: No thyromegaly  Vascular: No JVD  Trachea: No tracheal deviation  Cardiovascular:      Rate and Rhythm: Normal rate and regular rhythm  Heart sounds: Normal heart sounds  No murmur heard  No friction rub  No gallop  Pulmonary:      Effort: Pulmonary effort is normal  No respiratory distress  Breath sounds: Normal breath sounds  No wheezing or rales  Chest:      Chest wall: No tenderness  Abdominal:      General: Bowel sounds are normal  There is no distension  Palpations: Abdomen is soft  There is no mass  Tenderness: There is no abdominal tenderness  There is no guarding or rebound  Hernia: No hernia is present  Musculoskeletal:      Cervical back: Neck supple  Lymphadenopathy:      Cervical: No cervical adenopathy  Skin:     General: Skin is warm and dry  Findings: No erythema or rash  Neurological:      Mental Status: He is alert and oriented to person, place, and time     Psychiatric:         Behavior: Behavior normal          Thought Content: Thought content normal           Lab Results   Component Value Date    WBC 4 19 (L) 04/08/2019    HGB 15 9 04/08/2019    HCT 47 5 04/08/2019    MCV 95 04/08/2019     04/08/2019     Lab Results   Component Value Date    GLUCOSE 90 04/10/2015    CALCIUM 9 2 04/08/2019     (L) 04/10/2015    K 4 0 04/08/2019    CO2 27 04/08/2019     04/08/2019    BUN 12 04/08/2019    CREATININE 1 16 04/08/2019     Lab Results   Component Value Date    ALT 90 (H) 04/08/2019    AST 51 (H) 04/08/2019    GGT 42 07/18/2017    ALKPHOS 55 04/08/2019    BILITOT 2 32 (H) 04/10/2015     Lab Results   Component Value Date    INR 1 00 10/08/2018    PROTIME 12 9 10/08/2018       No results found  ASSESSMENT & PLAN:    Left upper quadrant abdominal pain  Rule out peptic ulcer disease or gastric erosions rule out H pylori gastritis  Also concerns for any colon pathology since his pain improves after bowel movement     -schedule for upper endoscopy    -continue famotidine    -Patient was explained about the lifestyle and dietary modifications  Advised to avoid fatty foods, chocolates, caffeine, alcohol and any other triggering foods  Avoid eating for at least 3 hours before going to bed  BRBPR (bright red blood per rectum)  Most likely from hemorrhoids  Rule out colorectal lesions including polyps or malignancy     -Schedule for colonoscopy  -High-fiber diet     -Patient was given instructions about the colonoscopy prep     -Patient was explained about  the risks and benefits of the procedure  Risks including but not limited to bleeding, infection, perforation were explained in detail  Also explained about less than 100% sensitivity with the exam and other alternatives

## 2022-01-07 DIAGNOSIS — K62.5 BRBPR (BRIGHT RED BLOOD PER RECTUM): Primary | ICD-10-CM

## 2022-01-07 RX ORDER — SODIUM, POTASSIUM,MAG SULFATES 17.5-3.13G
2 SOLUTION, RECONSTITUTED, ORAL ORAL SEE ADMIN INSTRUCTIONS
Qty: 177 ML | Refills: 0 | Status: SHIPPED | OUTPATIENT
Start: 2022-01-07 | End: 2022-03-03

## 2022-03-02 ENCOUNTER — TELEPHONE (OUTPATIENT)
Dept: GASTROENTEROLOGY | Facility: HOSPITAL | Age: 42
End: 2022-03-02

## 2022-03-03 ENCOUNTER — HOSPITAL ENCOUNTER (OUTPATIENT)
Dept: GASTROENTEROLOGY | Facility: HOSPITAL | Age: 42
Setting detail: OUTPATIENT SURGERY
Discharge: HOME | End: 2022-03-03
Attending: INTERNAL MEDICINE | Admitting: INTERNAL MEDICINE
Payer: COMMERCIAL

## 2022-03-03 ENCOUNTER — ANESTHESIA EVENT (OUTPATIENT)
Dept: GASTROENTEROLOGY | Facility: HOSPITAL | Age: 42
End: 2022-03-03

## 2022-03-03 ENCOUNTER — ANESTHESIA (OUTPATIENT)
Dept: GASTROENTEROLOGY | Facility: HOSPITAL | Age: 42
End: 2022-03-03

## 2022-03-03 VITALS
RESPIRATION RATE: 18 BRPM | TEMPERATURE: 97.9 F | SYSTOLIC BLOOD PRESSURE: 141 MMHG | HEIGHT: 66 IN | DIASTOLIC BLOOD PRESSURE: 96 MMHG | HEART RATE: 82 BPM | OXYGEN SATURATION: 98 % | WEIGHT: 315 LBS | BODY MASS INDEX: 50.62 KG/M2

## 2022-03-03 DIAGNOSIS — R10.12 LEFT UPPER QUADRANT ABDOMINAL PAIN: ICD-10-CM

## 2022-03-03 DIAGNOSIS — K62.5 BRBPR (BRIGHT RED BLOOD PER RECTUM): ICD-10-CM

## 2022-03-03 PROCEDURE — 43239 EGD BIOPSY SINGLE/MULTIPLE: CPT | Performed by: INTERNAL MEDICINE

## 2022-03-03 PROCEDURE — 88305 TISSUE EXAM BY PATHOLOGIST: CPT | Performed by: PATHOLOGY

## 2022-03-03 PROCEDURE — 45380 COLONOSCOPY AND BIOPSY: CPT | Performed by: INTERNAL MEDICINE

## 2022-03-03 RX ORDER — SODIUM CHLORIDE, SODIUM LACTATE, POTASSIUM CHLORIDE, CALCIUM CHLORIDE 600; 310; 30; 20 MG/100ML; MG/100ML; MG/100ML; MG/100ML
INJECTION, SOLUTION INTRAVENOUS CONTINUOUS PRN
Status: DISCONTINUED | OUTPATIENT
Start: 2022-03-03 | End: 2022-03-03

## 2022-03-03 RX ORDER — PROPOFOL 10 MG/ML
INJECTION, EMULSION INTRAVENOUS AS NEEDED
Status: DISCONTINUED | OUTPATIENT
Start: 2022-03-03 | End: 2022-03-03

## 2022-03-03 RX ORDER — KETAMINE HCL IN NACL, ISO-OSM 100MG/10ML
SYRINGE (ML) INJECTION AS NEEDED
Status: DISCONTINUED | OUTPATIENT
Start: 2022-03-03 | End: 2022-03-03

## 2022-03-03 RX ORDER — GLYCOPYRROLATE 0.2 MG/ML
INJECTION INTRAMUSCULAR; INTRAVENOUS AS NEEDED
Status: DISCONTINUED | OUTPATIENT
Start: 2022-03-03 | End: 2022-03-03

## 2022-03-03 RX ORDER — SODIUM CHLORIDE, SODIUM LACTATE, POTASSIUM CHLORIDE, CALCIUM CHLORIDE 600; 310; 30; 20 MG/100ML; MG/100ML; MG/100ML; MG/100ML
125 INJECTION, SOLUTION INTRAVENOUS CONTINUOUS
Status: DISCONTINUED | OUTPATIENT
Start: 2022-03-03 | End: 2022-03-07 | Stop reason: HOSPADM

## 2022-03-03 RX ORDER — LIDOCAINE HYDROCHLORIDE 20 MG/ML
INJECTION, SOLUTION EPIDURAL; INFILTRATION; INTRACAUDAL; PERINEURAL AS NEEDED
Status: DISCONTINUED | OUTPATIENT
Start: 2022-03-03 | End: 2022-03-03

## 2022-03-03 RX ADMIN — PROPOFOL 50 MG: 10 INJECTION, EMULSION INTRAVENOUS at 13:30

## 2022-03-03 RX ADMIN — PROPOFOL 100 MG: 10 INJECTION, EMULSION INTRAVENOUS at 13:38

## 2022-03-03 RX ADMIN — GLYCOPYRROLATE 0.2 MG: 0.2 INJECTION, SOLUTION INTRAMUSCULAR; INTRAVENOUS at 13:25

## 2022-03-03 RX ADMIN — Medication 20 MG: at 13:25

## 2022-03-03 RX ADMIN — PROPOFOL 100 MG: 10 INJECTION, EMULSION INTRAVENOUS at 13:42

## 2022-03-03 RX ADMIN — Medication 10 MG: at 13:30

## 2022-03-03 RX ADMIN — PROPOFOL 50 MG: 10 INJECTION, EMULSION INTRAVENOUS at 13:45

## 2022-03-03 RX ADMIN — PROPOFOL 50 MG: 10 INJECTION, EMULSION INTRAVENOUS at 13:49

## 2022-03-03 RX ADMIN — PROPOFOL 100 MG: 10 INJECTION, EMULSION INTRAVENOUS at 13:36

## 2022-03-03 RX ADMIN — PROPOFOL 150 MG: 10 INJECTION, EMULSION INTRAVENOUS at 13:25

## 2022-03-03 RX ADMIN — PROPOFOL 50 MG: 10 INJECTION, EMULSION INTRAVENOUS at 13:47

## 2022-03-03 RX ADMIN — LIDOCAINE HYDROCHLORIDE 100 MG: 20 INJECTION, SOLUTION EPIDURAL; INFILTRATION; INTRACAUDAL at 13:25

## 2022-03-03 RX ADMIN — SODIUM CHLORIDE, SODIUM LACTATE, POTASSIUM CHLORIDE, AND CALCIUM CHLORIDE: .6; .31; .03; .02 INJECTION, SOLUTION INTRAVENOUS at 13:21

## 2022-03-03 RX ADMIN — PROPOFOL 50 MG: 10 INJECTION, EMULSION INTRAVENOUS at 13:27

## 2022-03-03 NOTE — ANESTHESIA PREPROCEDURE EVALUATION
Procedure:  COLONOSCOPY  EGD    Relevant Problems   GI/HEPATIC   (+) BRBPR (bright red blood per rectum)   (+) Gastroesophageal reflux disease        Physical Exam    Airway    Mallampati score: III  TM Distance: >3 FB  Neck ROM: full     Dental   No notable dental hx     Cardiovascular      Pulmonary      Other Findings        Anesthesia Plan  ASA Score- 3     Anesthesia Type- IV sedation with anesthesia with ASA Monitors  Additional Monitors:   Airway Plan:           Plan Factors-Exercise tolerance (METS): >4 METS  Chart reviewed  Patient summary reviewed  Patient is not a current smoker  Induction- intravenous  Postoperative Plan-     Informed Consent- Anesthetic plan and risks discussed with patient  I personally reviewed this patient with the CRNA  Discussed and agreed on the Anesthesia Plan with the CRNA  Siddhartha Jung

## 2022-03-03 NOTE — H&P
History and Physical - [facility]  David Blanca 37 y o  male MRN: [id number]                  HPI: David Blanca is a 37y o  year old male who presents for left upper quadrant pain and rectal bleeding  REVIEW OF SYSTEMS: Per the HPI, and otherwise unremarkable  Historical Information   Past Medical History:   Diagnosis Date    Acid reflux     GERD (gastroesophageal reflux disease)     History of viral meningitis     Varicose veins of both lower extremities      Past Surgical History:   Procedure Laterality Date    KNEE ARTHROSCOPY W/ LATERAL RELEASE Right     [place] ENDOVENOUS LASER, 1ST VEIN Right 6/13/2017    Procedure: GSV EVLT W/ STAB PHLEBECTOMIES ;  Surgeon: Eleazar Olguin MD;  [facility];  Service: Vascular    ME ESOPHAGOGASTRODUODENOSCOPY TRANSORAL DIAGNOSTIC N/A 7/19/2016    Procedure: ESOPHAGOGASTRODUODENOSCOPY (EGD); Surgeon: Alejandro Zavala MD;  [facility]; Service: Gastroenterology    SHOULDER ARTHROSCOPY W/ LABRAL REPAIR Left     WISDOM TOOTH EXTRACTION       Social History   Social History     Substance and Sexual Activity   Alcohol Use Yes    Alcohol/week: 4 0 standard drinks    Types: 4 Cans of beer per week    Comment: social, weekends      Social History     Substance and Sexual Activity   Drug Use No     Social History     Tobacco Use   Smoking Status Never Smoker   Smokeless Tobacco Never Used     History reviewed  No pertinent family history      Meds/Allergies       Current Outpatient Medications:     famotidine (PEPCID) 20 mg tablet    glucosamine-chondroitin 500-400 MG tablet    TURMERIC PO    Omega-3 Fatty Acids (FISH OIL) 645 MG CAPS    Current Facility-Administered Medications:     lactated ringers infusion, 125 mL/hr, Intravenous, Continuous    No Known Allergies    Objective     /82   Pulse 91   Temp (!) 96 9 °F (36 1 °C) (Temporal)   Resp 20   Ht 5' 6" (1 676 m)   Wt (!) 143 kg (316 lb)   SpO2 99%   BMI 51 00 kg/m²       PHYSICAL EXAM    Gen: NAD  Head: NCAT  CV: RRR  CHEST: Clear  ABD: soft, NT/ND  EXT: no edema      ASSESSMENT/PLAN:  This is a 37y o  year old male here for upper endoscopy and colonoscopy, and he is stable and optimized for his procedure

## 2022-03-03 NOTE — ANESTHESIA POSTPROCEDURE EVALUATION
Post-Op Assessment Note    CV Status:  Stable  Pain Score: 0    Pain management: adequate     Mental Status:  Alert and awake   Hydration Status:  Euvolemic   PONV Controlled:  Controlled   Airway Patency:  Patent      Post Op Vitals Reviewed: Yes      Staff: CRNA         No complications documented      /86 (03/03/22 1357)    Temp 97 9 °F (36 6 °C) (03/03/22 1357)    Pulse 104 (03/03/22 1357)   Resp 18 (03/03/22 1357)    SpO2 95 % (03/03/22 1357)

## 2022-06-16 ENCOUNTER — APPOINTMENT (OUTPATIENT)
Dept: LAB | Facility: CLINIC | Age: 42
End: 2022-06-16

## 2022-06-16 DIAGNOSIS — Z00.8 ENCOUNTER FOR SPECIALIZED MEDICAL EXAMINATION: ICD-10-CM

## 2022-06-16 LAB
CHOLEST SERPL-MCNC: 241 MG/DL
EST. AVERAGE GLUCOSE BLD GHB EST-MCNC: 103 MG/DL
HBA1C MFR BLD: 5.2 %
HDLC SERPL-MCNC: 38 MG/DL
LDLC SERPL CALC-MCNC: 140 MG/DL (ref 0–100)
NONHDLC SERPL-MCNC: 203 MG/DL
TRIGL SERPL-MCNC: 315 MG/DL

## 2022-06-16 PROCEDURE — 80061 LIPID PANEL: CPT

## 2022-06-16 PROCEDURE — 36415 COLL VENOUS BLD VENIPUNCTURE: CPT

## 2022-06-16 PROCEDURE — 83036 HEMOGLOBIN GLYCOSYLATED A1C: CPT

## 2023-05-11 ENCOUNTER — APPOINTMENT (OUTPATIENT)
Dept: URGENT CARE | Facility: CLINIC | Age: 43
End: 2023-05-11

## 2023-05-11 ENCOUNTER — APPOINTMENT (OUTPATIENT)
Dept: RADIOLOGY | Facility: CLINIC | Age: 43
End: 2023-05-11

## 2023-05-11 ENCOUNTER — APPOINTMENT (OUTPATIENT)
Dept: LAB | Facility: CLINIC | Age: 43
End: 2023-05-11

## 2023-05-11 DIAGNOSIS — Z00.00 PHYSICAL EXAM: ICD-10-CM

## 2023-05-11 DIAGNOSIS — Z00.8 HEALTH EXAMINATION IN POPULATION SURVEY: ICD-10-CM

## 2023-05-11 LAB
ALBUMIN SERPL BCP-MCNC: 4.2 G/DL (ref 3.5–5)
ALP SERPL-CCNC: 68 U/L (ref 46–116)
ALT SERPL W P-5'-P-CCNC: 87 U/L (ref 12–78)
ANION GAP SERPL CALCULATED.3IONS-SCNC: 2 MMOL/L (ref 4–13)
AST SERPL W P-5'-P-CCNC: 66 U/L (ref 5–45)
ATRIAL RATE: 62 BPM
BILIRUB SERPL-MCNC: 1.76 MG/DL (ref 0.2–1)
BUN SERPL-MCNC: 9 MG/DL (ref 5–25)
CALCIUM SERPL-MCNC: 9.4 MG/DL (ref 8.3–10.1)
CHLORIDE SERPL-SCNC: 107 MMOL/L (ref 96–108)
CHOLEST SERPL-MCNC: 246 MG/DL
CO2 SERPL-SCNC: 28 MMOL/L (ref 21–32)
CREAT SERPL-MCNC: 1.06 MG/DL (ref 0.6–1.3)
GFR SERPL CREATININE-BSD FRML MDRD: 86 ML/MIN/1.73SQ M
GLUCOSE P FAST SERPL-MCNC: 91 MG/DL (ref 65–99)
HDLC SERPL-MCNC: 40 MG/DL
LDLC SERPL CALC-MCNC: 163 MG/DL (ref 0–100)
NONHDLC SERPL-MCNC: 206 MG/DL
P AXIS: 16 DEGREES
POTASSIUM SERPL-SCNC: 3.8 MMOL/L (ref 3.5–5.3)
PR INTERVAL: 146 MS
PROT SERPL-MCNC: 8.3 G/DL (ref 6.4–8.4)
QRS AXIS: -8 DEGREES
QRSD INTERVAL: 90 MS
QT INTERVAL: 462 MS
QTC INTERVAL: 468 MS
SODIUM SERPL-SCNC: 137 MMOL/L (ref 135–147)
T WAVE AXIS: 18 DEGREES
TRIGL SERPL-MCNC: 214 MG/DL
VENTRICULAR RATE: 62 BPM

## 2023-05-12 LAB
EST. AVERAGE GLUCOSE BLD GHB EST-MCNC: 97 MG/DL
HBA1C MFR BLD: 5 %

## 2025-03-06 ENCOUNTER — OCCMED (OUTPATIENT)
Dept: URGENT CARE | Facility: MEDICAL CENTER | Age: 45
End: 2025-03-06

## 2025-03-06 ENCOUNTER — APPOINTMENT (OUTPATIENT)
Dept: URGENT CARE | Facility: MEDICAL CENTER | Age: 45
End: 2025-03-06
Attending: PHYSICIAN ASSISTANT

## 2025-03-06 DIAGNOSIS — Z02.1 PHYSICAL EXAM, PRE-EMPLOYMENT: ICD-10-CM

## 2025-03-06 DIAGNOSIS — Z02.89 ENCOUNTER FOR PHYSICAL EXAMINATION RELATED TO EMPLOYMENT: Primary | ICD-10-CM

## 2025-03-06 LAB
ALBUMIN SERPL BCG-MCNC: 4.3 G/DL (ref 3.5–5)
ALP SERPL-CCNC: 66 U/L (ref 34–104)
ALT SERPL W P-5'-P-CCNC: 90 U/L (ref 7–52)
ANION GAP SERPL CALCULATED.3IONS-SCNC: 7 MMOL/L (ref 4–13)
AST SERPL W P-5'-P-CCNC: 63 U/L (ref 13–39)
BASOPHILS # BLD AUTO: 0.04 THOUSANDS/ÂΜL (ref 0–0.1)
BASOPHILS NFR BLD AUTO: 1 % (ref 0–1)
BILIRUB SERPL-MCNC: 1.58 MG/DL (ref 0.2–1)
BUN SERPL-MCNC: 13 MG/DL (ref 5–25)
CALCIUM SERPL-MCNC: 9.3 MG/DL (ref 8.4–10.2)
CHLORIDE SERPL-SCNC: 102 MMOL/L (ref 96–108)
CHOLEST SERPL-MCNC: 237 MG/DL (ref ?–200)
CO2 SERPL-SCNC: 29 MMOL/L (ref 21–32)
CREAT SERPL-MCNC: 0.98 MG/DL (ref 0.6–1.3)
EOSINOPHIL # BLD AUTO: 0.21 THOUSAND/ÂΜL (ref 0–0.61)
EOSINOPHIL NFR BLD AUTO: 4 % (ref 0–6)
ERYTHROCYTE [DISTWIDTH] IN BLOOD BY AUTOMATED COUNT: 11.9 % (ref 11.6–15.1)
GFR SERPL CREATININE-BSD FRML MDRD: 94 ML/MIN/1.73SQ M
GLUCOSE P FAST SERPL-MCNC: 98 MG/DL (ref 65–99)
HCT VFR BLD AUTO: 44.6 % (ref 36.5–49.3)
HDLC SERPL-MCNC: 41 MG/DL
HGB BLD-MCNC: 15 G/DL (ref 12–17)
IMM GRANULOCYTES # BLD AUTO: 0.01 THOUSAND/UL (ref 0–0.2)
IMM GRANULOCYTES NFR BLD AUTO: 0 % (ref 0–2)
LDLC SERPL CALC-MCNC: 119 MG/DL (ref 0–100)
LYMPHOCYTES # BLD AUTO: 1.15 THOUSANDS/ÂΜL (ref 0.6–4.47)
LYMPHOCYTES NFR BLD AUTO: 23 % (ref 14–44)
MCH RBC QN AUTO: 32.1 PG (ref 26.8–34.3)
MCHC RBC AUTO-ENTMCNC: 33.6 G/DL (ref 31.4–37.4)
MCV RBC AUTO: 95 FL (ref 82–98)
MONOCYTES # BLD AUTO: 0.58 THOUSAND/ÂΜL (ref 0.17–1.22)
MONOCYTES NFR BLD AUTO: 12 % (ref 4–12)
NEUTROPHILS # BLD AUTO: 2.94 THOUSANDS/ÂΜL (ref 1.85–7.62)
NEUTS SEG NFR BLD AUTO: 60 % (ref 43–75)
NONHDLC SERPL-MCNC: 196 MG/DL
NRBC BLD AUTO-RTO: 0 /100 WBCS
PLATELET # BLD AUTO: 178 THOUSANDS/UL (ref 149–390)
PMV BLD AUTO: 11.2 FL (ref 8.9–12.7)
POTASSIUM SERPL-SCNC: 4.1 MMOL/L (ref 3.5–5.3)
PROT SERPL-MCNC: 7.2 G/DL (ref 6.4–8.4)
RBC # BLD AUTO: 4.68 MILLION/UL (ref 3.88–5.62)
SODIUM SERPL-SCNC: 138 MMOL/L (ref 135–147)
TRIGL SERPL-MCNC: 385 MG/DL (ref ?–150)
WBC # BLD AUTO: 4.93 THOUSAND/UL (ref 4.31–10.16)

## 2025-03-06 PROCEDURE — 36415 COLL VENOUS BLD VENIPUNCTURE: CPT

## 2025-03-06 PROCEDURE — 80053 COMPREHEN METABOLIC PANEL: CPT

## 2025-03-06 PROCEDURE — 80061 LIPID PANEL: CPT

## 2025-03-06 PROCEDURE — 85025 COMPLETE CBC W/AUTO DIFF WBC: CPT

## 2025-03-06 NOTE — PROGRESS NOTES
Employment physical completed in office today. No concerns/complaints. Denies any current pain. On daily famotidine. No known allergies.

## (undated) DEVICE — ULTRASOUND GEL STERILE FOIL PK

## (undated) DEVICE — NEEDLE SPINAL 20G X 3.5 LF

## (undated) DEVICE — FIBER PROC KIT GOLD TIP 21G 45CM NEVERTOUCH

## (undated) DEVICE — GLOVE INDICATOR PI UNDERGLOVE SZ 6.5 BLUE

## (undated) DEVICE — KERLIX BANDAGE ROLL: Brand: KERLIX

## (undated) DEVICE — ACE WRAP 6 IN STERILE

## (undated) DEVICE — STRL UNIVERSAL MINOR GENERAL: Brand: CARDINAL HEALTH

## (undated) DEVICE — COBAN 4 IN UNSTERILE

## (undated) DEVICE — DRAPE SHEET THREE QUARTER

## (undated) DEVICE — Device: Brand: MEDEX

## (undated) DEVICE — 1200CC GUARDIAN II: Brand: GUARDIAN

## (undated) DEVICE — GLOVE SRG BIOGEL ECLIPSE 6

## (undated) DEVICE — COVER PROBE INTRAOPERATIVE 6 X 96 IN

## (undated) DEVICE — DRAPE SHEET X-LG

## (undated) DEVICE — TIBURON SPLIT SHEET: Brand: CONVERTORS

## (undated) DEVICE — ADHESIVE SKN CLSR HISTOACRYL FLEX 0.5ML LF

## (undated) DEVICE — ACE WRAP 4 IN STERILE

## (undated) DEVICE — INTENDED FOR TISSUE SEPARATION, AND OTHER PROCEDURES THAT REQUIRE A SHARP SURGICAL BLADE TO PUNCTURE OR CUT.: Brand: BARD-PARKER SAFETY BLADES SIZE 11, STERILE

## (undated) DEVICE — 3000CC GUARDIAN II: Brand: GUARDIAN

## (undated) DEVICE — IV SET 15 DROP STERILE 0/Y GRAVITY

## (undated) DEVICE — SYRINGE 20ML LL

## (undated) DEVICE — CHLORAPREP HI-LITE 26ML ORANGE

## (undated) DEVICE — STOPCOCK 3-WAY

## (undated) DEVICE — GAUZE SPONGES,16 PLY: Brand: CURITY

## (undated) DEVICE — TONGUE DEPRESSOR STERILE

## (undated) DEVICE — 3M™ COBAN™ NL STERILE NON-LATEX SELF-ADHERENT WRAP, 2084S, 4 IN X 5 YD (10 CM X 4,5 M), 18 ROLLS/CASE: Brand: 3M™ COBAN™

## (undated) DEVICE — INTENDED FOR TISSUE SEPARATION, AND OTHER PROCEDURES THAT REQUIRE A SHARP SURGICAL BLADE TO PUNCTURE OR CUT.: Brand: BARD-PARKER ® CARBON RIB-BACK BLADES